# Patient Record
Sex: MALE | Race: BLACK OR AFRICAN AMERICAN | NOT HISPANIC OR LATINO
[De-identification: names, ages, dates, MRNs, and addresses within clinical notes are randomized per-mention and may not be internally consistent; named-entity substitution may affect disease eponyms.]

---

## 2017-09-28 ENCOUNTER — APPOINTMENT (OUTPATIENT)
Dept: UROLOGY | Facility: CLINIC | Age: 72
End: 2017-09-28
Payer: COMMERCIAL

## 2017-09-28 VITALS
DIASTOLIC BLOOD PRESSURE: 100 MMHG | HEART RATE: 78 BPM | TEMPERATURE: 98.4 F | HEIGHT: 68 IN | SYSTOLIC BLOOD PRESSURE: 176 MMHG | RESPIRATION RATE: 17 BRPM

## 2017-09-28 DIAGNOSIS — R97.20 ELEVATED PROSTATE, SPECIFIC ANTIGEN [PSA]: ICD-10-CM

## 2017-09-28 DIAGNOSIS — R35.0 FREQUENCY OF MICTURITION: ICD-10-CM

## 2017-09-28 DIAGNOSIS — C61 MALIGNANT NEOPLASM OF PROSTATE: ICD-10-CM

## 2017-09-28 PROCEDURE — 99204 OFFICE O/P NEW MOD 45 MIN: CPT

## 2017-09-29 LAB — PSA SERPL-MCNC: 18.51 NG/ML

## 2017-10-15 ENCOUNTER — FORM ENCOUNTER (OUTPATIENT)
Age: 72
End: 2017-10-15

## 2017-10-16 ENCOUNTER — OUTPATIENT (OUTPATIENT)
Dept: OUTPATIENT SERVICES | Facility: HOSPITAL | Age: 72
LOS: 1 days | End: 2017-10-16
Payer: MEDICARE

## 2017-10-16 ENCOUNTER — APPOINTMENT (OUTPATIENT)
Dept: MRI IMAGING | Facility: IMAGING CENTER | Age: 72
End: 2017-10-16
Payer: COMMERCIAL

## 2017-10-16 DIAGNOSIS — C61 MALIGNANT NEOPLASM OF PROSTATE: ICD-10-CM

## 2017-10-16 PROCEDURE — A9585: CPT

## 2017-10-16 PROCEDURE — 82565 ASSAY OF CREATININE: CPT

## 2017-10-16 PROCEDURE — 72197 MRI PELVIS W/O & W/DYE: CPT | Mod: 26

## 2017-10-16 PROCEDURE — 72197 MRI PELVIS W/O & W/DYE: CPT

## 2017-10-19 ENCOUNTER — APPOINTMENT (OUTPATIENT)
Dept: UROLOGY | Facility: CLINIC | Age: 72
End: 2017-10-19
Payer: COMMERCIAL

## 2017-10-19 PROCEDURE — 99213 OFFICE O/P EST LOW 20 MIN: CPT

## 2017-10-27 ENCOUNTER — RECORD ABSTRACTING (OUTPATIENT)
Age: 72
End: 2017-10-27

## 2017-10-27 LAB
METANEPHRINE, PL: 102 PG/ML
NORMETANEPHRINE, PL: 181 PG/ML

## 2017-11-09 ENCOUNTER — RECORD ABSTRACTING (OUTPATIENT)
Age: 72
End: 2017-11-09

## 2017-11-09 DIAGNOSIS — D49.7 NEOPLASM OF UNSPECIFIED BEHAVIOR OF ENDOCRINE GLANDS AND OTHER PARTS OF NERVOUS SYSTEM: ICD-10-CM

## 2017-11-09 LAB
CREAT ?TM UR-MCNC: 138.8 MG/DL
DOPAMINE UR-MCNC: 77 UG/L
DOPAMINE UR-MCNC: <30 PG/ML
DOPAMINE, UR, 24HR: 169 UG/24 HR
DOPAMINE/CREAT UR: 113 MCG/G CR
EPINEPH UR-MCNC: 1 UG/L
EPINEPH UR-MCNC: 113 PG/ML
EPINEPH/CREAT UR: 10 MCG/G CR
EPINEPHRINE, U, 24HR: 2 UG/24 HR
EPINEPHRINE/NOEPINEPHRINE CALC TOTAL RAN: 68 MCG/G CR
METANEPH 24H UR-MRATE: NORMAL
METANEPHRINE, PL: 24 PG/ML
NOREPINEPH UR-MCNC: 22 UG/L
NOREPINEPH UR-MCNC: 804 PG/ML
NOREPINEPH/CREAT UR: 58 MCG/G CR
NOREPINEPHRINE,U,24H: 48 UG/24 HR
NORMETANEPHRINE, PL: 177 PG/ML

## 2017-11-20 ENCOUNTER — FORM ENCOUNTER (OUTPATIENT)
Age: 72
End: 2017-11-20

## 2017-11-21 ENCOUNTER — APPOINTMENT (OUTPATIENT)
Dept: MRI IMAGING | Facility: CLINIC | Age: 72
End: 2017-11-21
Payer: COMMERCIAL

## 2017-11-21 ENCOUNTER — OUTPATIENT (OUTPATIENT)
Dept: OUTPATIENT SERVICES | Facility: HOSPITAL | Age: 72
LOS: 1 days | End: 2017-11-21
Payer: MEDICARE

## 2017-11-21 DIAGNOSIS — D49.7 NEOPLASM OF UNSPECIFIED BEHAVIOR OF ENDOCRINE GLANDS AND OTHER PARTS OF NERVOUS SYSTEM: ICD-10-CM

## 2017-11-21 PROCEDURE — A9585: CPT

## 2017-11-21 PROCEDURE — 82565 ASSAY OF CREATININE: CPT

## 2017-11-21 PROCEDURE — 74183 MRI ABD W/O CNTR FLWD CNTR: CPT | Mod: 26

## 2017-11-21 PROCEDURE — 74183 MRI ABD W/O CNTR FLWD CNTR: CPT

## 2017-11-30 LAB — METANEPHS UR-SCNC: NORMAL

## 2018-01-17 ENCOUNTER — INPATIENT (INPATIENT)
Facility: HOSPITAL | Age: 73
LOS: 3 days | Discharge: HOME | DRG: 149 | End: 2018-01-21
Attending: INTERNAL MEDICINE | Admitting: INTERNAL MEDICINE
Payer: SELF-PAY

## 2018-01-17 VITALS
RESPIRATION RATE: 20 BRPM | HEART RATE: 83 BPM | SYSTOLIC BLOOD PRESSURE: 179 MMHG | TEMPERATURE: 99 F | DIASTOLIC BLOOD PRESSURE: 94 MMHG | OXYGEN SATURATION: 99 %

## 2018-01-17 DIAGNOSIS — R42 DIZZINESS AND GIDDINESS: ICD-10-CM

## 2018-01-17 DIAGNOSIS — Z95.1 PRESENCE OF AORTOCORONARY BYPASS GRAFT: Chronic | ICD-10-CM

## 2018-01-17 LAB
ALBUMIN SERPL ELPH-MCNC: 4.5 G/DL — SIGNIFICANT CHANGE UP (ref 3.3–5)
ALP SERPL-CCNC: 72 U/L — SIGNIFICANT CHANGE UP (ref 40–120)
ALT FLD-CCNC: 16 U/L RC — SIGNIFICANT CHANGE UP (ref 10–45)
ANION GAP SERPL CALC-SCNC: 11 MMOL/L — SIGNIFICANT CHANGE UP (ref 5–17)
APPEARANCE UR: CLEAR — SIGNIFICANT CHANGE UP
AST SERPL-CCNC: 22 U/L — SIGNIFICANT CHANGE UP (ref 10–40)
BILIRUB SERPL-MCNC: 1.2 MG/DL — SIGNIFICANT CHANGE UP (ref 0.2–1.2)
BILIRUB UR-MCNC: NEGATIVE — SIGNIFICANT CHANGE UP
BUN SERPL-MCNC: 14 MG/DL — SIGNIFICANT CHANGE UP (ref 7–23)
CALCIUM SERPL-MCNC: 9.3 MG/DL — SIGNIFICANT CHANGE UP (ref 8.4–10.5)
CHLORIDE SERPL-SCNC: 94 MMOL/L — LOW (ref 96–108)
CO2 SERPL-SCNC: 29 MMOL/L — SIGNIFICANT CHANGE UP (ref 22–31)
COLOR SPEC: COLORLESS — SIGNIFICANT CHANGE UP
CREAT SERPL-MCNC: 1.26 MG/DL — SIGNIFICANT CHANGE UP (ref 0.5–1.3)
DIFF PNL FLD: NEGATIVE — SIGNIFICANT CHANGE UP
EPI CELLS # UR: SIGNIFICANT CHANGE UP /HPF
GLUCOSE SERPL-MCNC: 100 MG/DL — HIGH (ref 70–99)
GLUCOSE UR QL: NEGATIVE — SIGNIFICANT CHANGE UP
HCT VFR BLD CALC: 43.1 % — SIGNIFICANT CHANGE UP (ref 39–50)
HGB BLD-MCNC: 15.2 G/DL — SIGNIFICANT CHANGE UP (ref 13–17)
KETONES UR-MCNC: NEGATIVE — SIGNIFICANT CHANGE UP
LEUKOCYTE ESTERASE UR-ACNC: NEGATIVE — SIGNIFICANT CHANGE UP
MCHC RBC-ENTMCNC: 33.1 PG — SIGNIFICANT CHANGE UP (ref 27–34)
MCHC RBC-ENTMCNC: 35.3 GM/DL — SIGNIFICANT CHANGE UP (ref 32–36)
MCV RBC AUTO: 93.7 FL — SIGNIFICANT CHANGE UP (ref 80–100)
NITRITE UR-MCNC: NEGATIVE — SIGNIFICANT CHANGE UP
PH UR: 6.5 — SIGNIFICANT CHANGE UP (ref 5–8)
PLATELET # BLD AUTO: 200 K/UL — SIGNIFICANT CHANGE UP (ref 150–400)
POTASSIUM SERPL-MCNC: 3.8 MMOL/L — SIGNIFICANT CHANGE UP (ref 3.5–5.3)
POTASSIUM SERPL-SCNC: 3.8 MMOL/L — SIGNIFICANT CHANGE UP (ref 3.5–5.3)
PROT SERPL-MCNC: 7.7 G/DL — SIGNIFICANT CHANGE UP (ref 6–8.3)
PROT UR-MCNC: NEGATIVE — SIGNIFICANT CHANGE UP
RBC # BLD: 4.6 M/UL — SIGNIFICANT CHANGE UP (ref 4.2–5.8)
RBC # FLD: 11.4 % — SIGNIFICANT CHANGE UP (ref 10.3–14.5)
SODIUM SERPL-SCNC: 134 MMOL/L — LOW (ref 135–145)
SP GR SPEC: <1.005 — LOW (ref 1.01–1.02)
TROPONIN T SERPL-MCNC: <0.01 NG/ML — SIGNIFICANT CHANGE UP (ref 0–0.06)
UROBILINOGEN FLD QL: NEGATIVE — SIGNIFICANT CHANGE UP
WBC # BLD: 6.1 K/UL — SIGNIFICANT CHANGE UP (ref 3.8–10.5)
WBC # FLD AUTO: 6.1 K/UL — SIGNIFICANT CHANGE UP (ref 3.8–10.5)

## 2018-01-17 PROCEDURE — 71046 X-RAY EXAM CHEST 2 VIEWS: CPT | Mod: 26

## 2018-01-17 PROCEDURE — 99285 EMERGENCY DEPT VISIT HI MDM: CPT

## 2018-01-17 RX ORDER — SODIUM CHLORIDE 9 MG/ML
1000 INJECTION INTRAMUSCULAR; INTRAVENOUS; SUBCUTANEOUS ONCE
Qty: 0 | Refills: 0 | Status: COMPLETED | OUTPATIENT
Start: 2018-01-17 | End: 2018-01-17

## 2018-01-17 RX ORDER — MECLIZINE HCL 12.5 MG
50 TABLET ORAL ONCE
Qty: 0 | Refills: 0 | Status: COMPLETED | OUTPATIENT
Start: 2018-01-17 | End: 2018-01-17

## 2018-01-17 RX ADMIN — SODIUM CHLORIDE 2000 MILLILITER(S): 9 INJECTION INTRAMUSCULAR; INTRAVENOUS; SUBCUTANEOUS at 18:24

## 2018-01-17 RX ADMIN — Medication 50 MILLIGRAM(S): at 20:44

## 2018-01-17 RX ADMIN — SODIUM CHLORIDE 2000 MILLILITER(S): 9 INJECTION INTRAMUSCULAR; INTRAVENOUS; SUBCUTANEOUS at 22:24

## 2018-01-17 NOTE — ED PROVIDER NOTE - OBJECTIVE STATEMENT
71 yo m with pmhx of CAD and pshx of quadruple bypass 5 years ago presents with dizziness and fatigue that began 7-8 days ago. Dizziness exacerbated when quickly getting up or lying down. Denies history of HTN, but has noticed blood pressure to be elevated in the past couple of months. Had been regularly taking 50 mg Toprol once a day but changed it to 75 mg twice a day 3-4 days ago. Pt reports feeling light headed and uncomfortable, especially when getting up and lying down quickly. Denies fever, chest pain, swelling in legs, dysuria, HLD, weakness, pain. C/o slight shortness of breath and nausea.

## 2018-01-17 NOTE — ED ADULT NURSE NOTE - CHIEF COMPLAINT QUOTE
Dizziness and LOZANO x 1 week, worsened today. Pt reports he began taking his pressure at home the past few days and it has been high. Pt got an appointment with his PCP for next Tuesday and self-increased BP meds in the mean time until he sees his MD. Pt has hx CABG x 4 and HTN, on metoprolol. Pt also reports hx IBS with diarrhea today, states "I feel dry." Pt denies chest pain at this time.

## 2018-01-17 NOTE — ED PROVIDER NOTE - MEDICAL DECISION MAKING DETAILS
71 yo male with pmhx of CAD presents with dizziness, slight shortness of breath, and nausea. On exam, gait ws normal. EKG within normal limits. Plan: labs, urine, cardiac monitor, and reassess. 73 yo male with pmhx of CAD presents with dizziness, slight shortness of breath, and nausea. On exam, neuro exam nonfocal, gait normal. EKG within normal limits. assessment: no focal neuro findings - pt describes dizziness with position change. Suspect orthostatic hypotension. Will reassess after IVF. Plan: labs, urine, cardiac monitor, and reassess.

## 2018-01-17 NOTE — PROVIDER CONTACT NOTE (OTHER) - ASSESSMENT
Pt A&Ox4. VSS-blood pressure manually 150/80. Pt does not want to get up till after breakfast. Pt states that he feels dizzy getting out of bed.

## 2018-01-17 NOTE — ED PROVIDER NOTE - NS_ ATTENDINGSCRIBEDETAILS _ED_A_ED_FT
I,Patti Navarro, performed the initial face to face bedside interview with this patient regarding history of present illness, review of symptoms and relevant past medical, social and family history.  I completed an independent physical examination.  I was the initial provider who evaluated this patient. The history, relevant review of systems, past medical and surgical history, medical decision making, and physical examination was documented by the scribe in my presence and I attest to the accuracy of the documentation.

## 2018-01-17 NOTE — ED PROVIDER NOTE - PROGRESS NOTE DETAILS
Chandan Angela MD: Pt reassessed, still getting fluids, discussed possible neuro admission vs outpatient therapy. Pt states feeling slightly better, feels comfortable going home with outpatient follow up with PMD/cardiologist/ neurologist for symptoms. Dramamine - feeling slightly better, will try meclozine at this time with fluids. Chandan Angela MD: patient still symptomatic, will admit to hospital, spoke to Dr. Zay Lowery who states if patient under Dr. Gao please admit to Dr. Howard or hosptialist. MRI ordered and neurology consulted.

## 2018-01-17 NOTE — ED ADULT TRIAGE NOTE - CHIEF COMPLAINT QUOTE
Dizziness x 1 week, worsened today. Pt reports he began taking his pressure at home the past few days and it has been high. Pt got an appointment with his PCP for next Tuesday and self-increased BP meds in the mean time until he sees his MD. Pt has hx CABG x 4 and HTN, on metoprolol. Pt also reports hx IBS with diarrhea today, states "I feel dry." Pt denies chest pain at this time.

## 2018-01-17 NOTE — PROVIDER CONTACT NOTE (OTHER) - REASON
Pt is refusing to do orthostatics this morning, refuses heparin and wants to take metoprolol after breakfast

## 2018-01-17 NOTE — ED ADULT NURSE NOTE - OBJECTIVE STATEMENT
71 yo M with pmhx of CAD, htn, pw dizziness x 1 week worsening today. Worse when pt goes from lying to standing. Orthostatic negative.   VSS. NAD. denies cp/sob/ focal weakness, fevers/chills, n/v/d, abd pain, melena, HA/visual changes.  Abd soft, nt/nd.

## 2018-01-18 DIAGNOSIS — Z29.9 ENCOUNTER FOR PROPHYLACTIC MEASURES, UNSPECIFIED: ICD-10-CM

## 2018-01-18 DIAGNOSIS — R42 DIZZINESS AND GIDDINESS: ICD-10-CM

## 2018-01-18 DIAGNOSIS — I25.810 ATHEROSCLEROSIS OF CORONARY ARTERY BYPASS GRAFT(S) WITHOUT ANGINA PECTORIS: ICD-10-CM

## 2018-01-18 LAB
ANION GAP SERPL CALC-SCNC: 14 MMOL/L — SIGNIFICANT CHANGE UP (ref 5–17)
APTT BLD: 27.5 SEC — SIGNIFICANT CHANGE UP (ref 27.5–37.4)
BASOPHILS # BLD AUTO: 0.02 K/UL — SIGNIFICANT CHANGE UP (ref 0–0.2)
BASOPHILS NFR BLD AUTO: 0.3 % — SIGNIFICANT CHANGE UP (ref 0–2)
BUN SERPL-MCNC: 9 MG/DL — SIGNIFICANT CHANGE UP (ref 7–23)
CALCIUM SERPL-MCNC: 9.1 MG/DL — SIGNIFICANT CHANGE UP (ref 8.4–10.5)
CHLORIDE SERPL-SCNC: 103 MMOL/L — SIGNIFICANT CHANGE UP (ref 96–108)
CK MB BLD-MCNC: 1.4 % — SIGNIFICANT CHANGE UP (ref 0–3.5)
CK MB CFR SERPL CALC: 1.7 NG/ML — SIGNIFICANT CHANGE UP (ref 0–6.7)
CK MB CFR SERPL CALC: 2 NG/ML — SIGNIFICANT CHANGE UP (ref 0–6.7)
CK SERPL-CCNC: 122 U/L — SIGNIFICANT CHANGE UP (ref 30–200)
CK SERPL-CCNC: 149 U/L — SIGNIFICANT CHANGE UP (ref 30–200)
CO2 SERPL-SCNC: 24 MMOL/L — SIGNIFICANT CHANGE UP (ref 22–31)
CORTIS AM PEAK SERPL-MCNC: 4.3 UG/DL — LOW (ref 6–18.4)
CREAT SERPL-MCNC: 1.15 MG/DL — SIGNIFICANT CHANGE UP (ref 0.5–1.3)
EOSINOPHIL # BLD AUTO: 0.04 K/UL — SIGNIFICANT CHANGE UP (ref 0–0.5)
EOSINOPHIL NFR BLD AUTO: 0.6 % — SIGNIFICANT CHANGE UP (ref 0–6)
GLUCOSE SERPL-MCNC: 92 MG/DL — SIGNIFICANT CHANGE UP (ref 70–99)
HBA1C BLD-MCNC: 4.9 % — SIGNIFICANT CHANGE UP (ref 4–5.6)
HCT VFR BLD CALC: 42.2 % — SIGNIFICANT CHANGE UP (ref 39–50)
HGB BLD-MCNC: 14.2 G/DL — SIGNIFICANT CHANGE UP (ref 13–17)
IMM GRANULOCYTES NFR BLD AUTO: 0.2 % — SIGNIFICANT CHANGE UP (ref 0–1.5)
INR BLD: 1.01 RATIO — SIGNIFICANT CHANGE UP (ref 0.88–1.16)
LYMPHOCYTES # BLD AUTO: 2.75 K/UL — SIGNIFICANT CHANGE UP (ref 1–3.3)
LYMPHOCYTES # BLD AUTO: 42.4 % — SIGNIFICANT CHANGE UP (ref 13–44)
MCHC RBC-ENTMCNC: 30.6 PG — SIGNIFICANT CHANGE UP (ref 27–34)
MCHC RBC-ENTMCNC: 33.6 GM/DL — SIGNIFICANT CHANGE UP (ref 32–36)
MCV RBC AUTO: 90.9 FL — SIGNIFICANT CHANGE UP (ref 80–100)
MONOCYTES # BLD AUTO: 0.52 K/UL — SIGNIFICANT CHANGE UP (ref 0–0.9)
MONOCYTES NFR BLD AUTO: 8 % — SIGNIFICANT CHANGE UP (ref 2–14)
NEUTROPHILS # BLD AUTO: 3.15 K/UL — SIGNIFICANT CHANGE UP (ref 1.8–7.4)
NEUTROPHILS NFR BLD AUTO: 48.5 % — SIGNIFICANT CHANGE UP (ref 43–77)
PLATELET # BLD AUTO: 201 K/UL — SIGNIFICANT CHANGE UP (ref 150–400)
POTASSIUM SERPL-MCNC: 4 MMOL/L — SIGNIFICANT CHANGE UP (ref 3.5–5.3)
POTASSIUM SERPL-SCNC: 4 MMOL/L — SIGNIFICANT CHANGE UP (ref 3.5–5.3)
PROTHROM AB SERPL-ACNC: 11.4 SEC — SIGNIFICANT CHANGE UP (ref 10–13.1)
RBC # BLD: 4.64 M/UL — SIGNIFICANT CHANGE UP (ref 4.2–5.8)
RBC # FLD: 12.5 % — SIGNIFICANT CHANGE UP (ref 10.3–14.5)
SODIUM SERPL-SCNC: 141 MMOL/L — SIGNIFICANT CHANGE UP (ref 135–145)
TROPONIN T SERPL-MCNC: <0.01 NG/ML — SIGNIFICANT CHANGE UP (ref 0–0.06)
TROPONIN T SERPL-MCNC: <0.01 NG/ML — SIGNIFICANT CHANGE UP (ref 0–0.06)
WBC # BLD: 6.49 K/UL — SIGNIFICANT CHANGE UP (ref 3.8–10.5)
WBC # FLD AUTO: 6.49 K/UL — SIGNIFICANT CHANGE UP (ref 3.8–10.5)

## 2018-01-18 PROCEDURE — 99223 1ST HOSP IP/OBS HIGH 75: CPT

## 2018-01-18 PROCEDURE — 93306 TTE W/DOPPLER COMPLETE: CPT | Mod: 26

## 2018-01-18 RX ORDER — ASPIRIN/CALCIUM CARB/MAGNESIUM 324 MG
81 TABLET ORAL DAILY
Qty: 0 | Refills: 0 | Status: DISCONTINUED | OUTPATIENT
Start: 2018-01-18 | End: 2018-01-21

## 2018-01-18 RX ORDER — DIAZEPAM 5 MG
5 TABLET ORAL ONCE
Qty: 0 | Refills: 0 | Status: DISCONTINUED | OUTPATIENT
Start: 2018-01-18 | End: 2018-01-18

## 2018-01-18 RX ORDER — INFLUENZA VIRUS VACCINE 15; 15; 15; 15 UG/.5ML; UG/.5ML; UG/.5ML; UG/.5ML
0.5 SUSPENSION INTRAMUSCULAR ONCE
Qty: 0 | Refills: 0 | Status: COMPLETED | OUTPATIENT
Start: 2018-01-18 | End: 2018-01-21

## 2018-01-18 RX ORDER — DIAZEPAM 5 MG
5 TABLET ORAL EVERY 8 HOURS
Qty: 0 | Refills: 0 | Status: DISCONTINUED | OUTPATIENT
Start: 2018-01-18 | End: 2018-01-21

## 2018-01-18 RX ORDER — ACETAMINOPHEN 500 MG
650 TABLET ORAL EVERY 6 HOURS
Qty: 0 | Refills: 0 | Status: DISCONTINUED | OUTPATIENT
Start: 2018-01-18 | End: 2018-01-21

## 2018-01-18 RX ORDER — HEPARIN SODIUM 5000 [USP'U]/ML
5000 INJECTION INTRAVENOUS; SUBCUTANEOUS EVERY 8 HOURS
Qty: 0 | Refills: 0 | Status: DISCONTINUED | OUTPATIENT
Start: 2018-01-18 | End: 2018-01-21

## 2018-01-18 RX ORDER — ROSUVASTATIN CALCIUM 5 MG/1
5 TABLET ORAL AT BEDTIME
Qty: 0 | Refills: 0 | Status: DISCONTINUED | OUTPATIENT
Start: 2018-01-18 | End: 2018-01-21

## 2018-01-18 RX ORDER — METOPROLOL TARTRATE 50 MG
50 TABLET ORAL DAILY
Qty: 0 | Refills: 0 | Status: DISCONTINUED | OUTPATIENT
Start: 2018-01-18 | End: 2018-01-21

## 2018-01-18 RX ADMIN — Medication 81 MILLIGRAM(S): at 11:40

## 2018-01-18 RX ADMIN — Medication 50 MILLIGRAM(S): at 08:52

## 2018-01-18 RX ADMIN — Medication 5 MILLIGRAM(S): at 01:22

## 2018-01-18 NOTE — H&P ADULT - HISTORY OF PRESENT ILLNESS
NIGHT HOSPITALIST:  Patient UNKNOWN to me previously, assigned to me at this point via the ER and by Dr. 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  Patient noted some relief with meclizine given in the ER but patient declines further doses.  Patient denies HA, focal weakness.  No chest pain/pressure.  No dyspnea at present, but admits to exercise limited symptoms.  No abdominal pain, no red blood per rectum or melena.  NO back pain, no tearing back pain.  No fever, no chills, no rigors.  NO hematuria, no dysuria.  Patient admits to increased thirst and urination but reports a normal random glucose.  No weight loss or anorexia.  No sick contacts or recent travel.  Remaining review of systems not contributory.

## 2018-01-18 NOTE — PROGRESS NOTE ADULT - ASSESSMENT
73 yo male history as noted including CAD, htn, a/w dizziness    neuro consult noted  awaiting mri/a brain  meclizine prn    cards f/u  echo pending    cont home cardiac meds  monitor bp    dvt ppx  PT

## 2018-01-18 NOTE — H&P ADULT - NSHPLABSRESULTS_GEN_ALL_CORE
WBC 6.1.  Hgb 15.2.  Platelets of 200K.  K+ 3.8.  random glucose of 100.  Cr 1.2.  Troponin nil x 1.  Alb 4.5.  U/A negative.  Chest radiograph reviewed with no infiltrate or effusion.  Chest radiograph reviewed with no infiltrate or effusion.  EKG tracing reviewed with NSR at 73 with Q III, F.

## 2018-01-18 NOTE — H&P ADULT - ATTENDING COMMENTS
NIGHT HOSPITALIST:  Patient aware of course and agree with plan/care as above.  Care reviewed with the covering NP.  Care assumed by the Dr. Pack.

## 2018-01-18 NOTE — H&P ADULT - PROBLEM SELECTOR PLAN 1
Appreciate neurology evaluation.  MRI brain ordered.  Admitted to telemetry to exclude cardiac equivalent.  Orthostatics.  Metoprolol changed to 50 mg daily for now.

## 2018-01-18 NOTE — CONSULT NOTE ADULT - SUBJECTIVE AND OBJECTIVE BOX
BENNETT RODRIGUEZ Patient is a 72y old  Male who presents with a chief complaint of dizziness.     HPI:  Pt is a 71 y/o man with hx of HTN who p/w 7 days of dizziness and room-spinning sensation. 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 He took his blood pressure and it was in the 180's -190s so he increased his dose of metoprolol. He also started taking Dramamine 50mg BID but felt that this didn't help. The dizziness is usually worse when getting up suddenly out of bed or moving quickly. He doesn't notice it particularly to one side. In the last couple days he thinks it has been more constant even if he is sitting down still. He describes it as things moving around him, that he is off balance slightly, but no gait abnormality.  His BP has been fluctuating recently and was worked up as outpatient for possible pheo but was equivocal.       MEDICATIONS  (STANDING): Home: Metoprolol, Aspirin, Crestor, Lomotil PRN, Zantac PRN  diazepam    Tablet 5 milliGRAM(s) Oral once      PAST MEDICAL & SURGICAL HISTORY:  Coronary artery disease  S/P CABG x 4  HTN    FAMILY HISTORY: HTN    Allergies, No Known  Drug Allergies; seasonal    Intolerances: None    SHx - No smoking, occasional wine No drug abuse      REVIEW OF SYSTEMS:    Constitutional: No fever, weight loss, or fatigue  Eyes: No eye pain, visual disturbances, or discharge  ENMT:  No difficulty hearing, tinnitus, vertigo; No sinus or throat pain  Neck: No pain or stiffness  Respiratory:  some shortness of breath after episodes  Cardiovascular: No chest pain, palpitations, or leg swelling  Gastrointestinal: No abdominal pain, nausea, vomiting, diarrhea or constipation.   Genitourinary: No dysuria, frequency, hematuria, or incontinence  Neurological: As per HPI  Skin: No itching, burning, rashes, or lesions   Endocrine: No heat or cold intolerance; No hair loss  Musculoskeletal: No joint pain or swelling; No muscle, back, or extremity pain  Psychiatric: anxiety  Heme/Lymph: No easy bruising or bleeding; No enlarged glands      Vital Signs Last 24 Hrs  T(C): 36.9 (17 Jan 2018 22:32), Max: 37.1 (17 Jan 2018 15:03)  T(F): 98.5 (17 Jan 2018 22:32), Max: 98.7 (17 Jan 2018 15:03)  HR: 78 (17 Jan 2018 22:32) (78 - 83)  BP: 154/78 (17 Jan 2018 22:32) (154/78 - 179/94)  BP(mean): --  RR: 18 (17 Jan 2018 22:32) (18 - 20)  SpO2: 98% (17 Jan 2018 22:32) (98% - 99%)    General Exam:   General appearance: No acute distress    Cardiac: RRR  Pulm:        breathing comfortably         Neurological Exam:  Mental Status: Orientated to self, date and place.  Attention intact.  No dysarthria. Speech fluent.  Cranial Nerves:   PERRL, EOMI, VFF, no nystagmus.    CN V1-3 intact to light touch .  No facial asymmetry.  Hearing intact bilaterally.  Tongue  midline.  Sternocleidomastoid and Trapezius intact bilaterally.    Motor:   Tone: normal.                  Strength:     [] Upper extremity                      Delt       Bicep    Tricep                                                  R         5/5        5/5        5/5       5/5                                               L          5/5        5/5        5/5       5/5  [] Lower extremity                       HF          KE          KF        DF         PF                                               R        5/5        5/5        5/5       5/5       5/5                                               L         5/5        5/5       5/5       5/5        5/5             Dysmetria: None to finger-nose-finger or heel-shin-heel  No truncal ataxia.    Tremor: No resting, postural or action tremor.  No myoclonus.    Sensation: intact to light touch, pinprick, vibration and proprioception    Deep Tendon Reflexes:     Biceps          Triceps      BR        Patellar        Ankle         Babinski                                  R       2+                   2+           2+            2+               2+           downgoing                                  L        2+                  2+           2+            2+               2+           downgoing    Gait: normal.  Difficulty with tandem which pt attributed to being lightheaded from not eating.     Other: Kay Yee Osgood maneuver Positive to  the Right, subsided after 20 seconds    01-17    134<L>  |  94<L>  |  14  ----------------------------<  100<H>  3.8   |  29  |  1.26    Ca    9.3      17 Jan 2018 16:47    TPro  7.7  /  Alb  4.5  /  TBili  1.2  /  DBili  x   /  AST  22  /  ALT  16  /  AlkPhos  72  01-17                            15.2   6.1   )-----------( 200      ( 17 Jan 2018 16:47 )             43.1

## 2018-01-18 NOTE — CONSULT NOTE ADULT - SUBJECTIVE AND OBJECTIVE BOX
Patient seen and evaluated @ 8am on [address]  Chief Complaint: Dizziness/vertigo    HPI:  NIGHT HOSPITALIST:  Patient UNKNOWN to me previously, assigned to me at this point via the ER and by Dr. 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  Patient noted some relief with meclizine given in the ER but patient declines further doses.  Patient denies HA, focal weakness.  No chest pain/pressure.  No dyspnea at present, but admits to exercise limited symptoms.  No abdominal pain, no red blood per rectum or melena.  NO back pain, no tearing back pain.  No fever, no chills, no rigors.  NO hematuria, no dysuria.  Patient admits to increased thirst and urination but reports a normal random glucose.  No weight loss or anorexia.  No sick contacts or recent travel.  Remaining review of systems not contributory. (18 Jan 2018 02:55)    PMH:   Coronary artery disease    PSH:   S/P CABG x 4    Medications:   acetaminophen   Tablet. 650 milliGRAM(s) Oral every 6 hours PRN  aspirin enteric coated 81 milliGRAM(s) Oral daily  diazepam    Tablet 5 milliGRAM(s) Oral every 8 hours PRN  heparin  Injectable 5000 Unit(s) SubCutaneous every 8 hours  influenza   Vaccine 0.5 milliLiter(s) IntraMuscular once  metoprolol succinate ER 50 milliGRAM(s) Oral daily  rosuvastatin 5 milliGRAM(s) Oral at bedtime    Allergies:  No Known Allergies    FAMILY HISTORY:  Family history of hypertension (Sibling)    Social History: , works as cardiologist  Smoking: nonsmoker  Alcohol: no EtOH  Drugs: no illicit drug use    Review of Systems:   Constitutional: No fever, chills, fatigue, or changes in weight  HEENT: No blurry vision, eye pain, headache, runny nose, or sore throat  Respiratory: No shortness of breath, cough, or wheezing  Cardiovascular: No chest pain or palpitations  Gastrointestinal: No nausea, vomiting, diarrhea, or abdominal pain  Genitourinary: No dysuria or incontinence  Extremities: No lower extremity swelling  Neurologic: +vertigo (see HPI)  Lymphatic: No lymphadenopathy   Skin: No rash  Psychiatry: No anxiety or depression  10 point review of systems is otherwise negative except as mentioned above            Physical Exam:  T(C): 36.7 (01-18-18 @ 12:23), Max: 36.9 (01-17-18 @ 22:32)  HR: 85 (01-18-18 @ 08:47) (70 - 85)  BP: 172/83 (01-18-18 @ 08:47) (150/80 - 173/91)  RR: 18 (01-18-18 @ 12:23) (18 - 18)  SpO2: 96% (01-18-18 @ 12:23) (96% - 98%)  Wt(kg): --    01-17 @ 07:01  -  01-18 @ 07:00  --------------------------------------------------------  IN: 200 mL / OUT: 550 mL / NET: -350 mL    01-18 @ 07:01  -  01-18 @ 19:14  --------------------------------------------------------  IN: 480 mL / OUT: 900 mL / NET: -420 mL      Daily Height in cm: 172.72 (18 Jan 2018 03:59)    Daily     Appearance: Normal, well groomed, NAD  Eyes: PERRLA, EOMI, pink conjunctiva, no scleral icterus   HENT: Normal oral mucosa  Cardiovascular: RRR, S1, S2, no murmur, rub, or gallop; no edema; no JVD  Procedural Access Site: Clean, dry, intact, without hematoma  Respiratory: Clear to auscultation bilaterally  Gastrointestinal: Soft, non-tender, non-distended, BS+  Musculoskeletal: No clubbing or joint deformity   Neurologic: No focal weakness  Lymphatic: No lymphadenopathy  Psychiatry: AAOx3 with appropriate mood and affect  Skin: No rashes, ecchymoses, or cyanosis    Cardiovascular Diagnostic Testing:    ECG: NSR    Echo: PENDING    Interpretation of Telemetry: NSR with PVCs    Imaging:    Labs:                        14.2   6.49  )-----------( 201      ( 18 Jan 2018 07:57 )             42.2     01-18    141  |  103  |  9   ----------------------------<  92  4.0   |  24  |  1.15    Ca    9.1      18 Jan 2018 08:07    TPro  7.7  /  Alb  4.5  /  TBili  1.2  /  DBili  x   /  AST  22  /  ALT  16  /  AlkPhos  72  01-17    PT/INR - ( 18 Jan 2018 07:56 )   PT: 11.4 sec;   INR: 1.01 ratio         PTT - ( 18 Jan 2018 07:56 )  PTT:27.5 sec  CARDIAC MARKERS ( 18 Jan 2018 17:29 )  x     / <0.01 ng/mL / x     / x     / 2.0 ng/mL  CARDIAC MARKERS ( 18 Jan 2018 05:43 )  x     / <0.01 ng/mL / 122 U/L / x     / 1.7 ng/mL  CARDIAC MARKERS ( 17 Jan 2018 16:47 )  x     / <0.01 ng/mL / x     / x     / x              Hemoglobin A1C, Whole Blood: 4.9 % (01-18 @ 07:57)

## 2018-01-18 NOTE — CONSULT NOTE ADULT - ATTENDING COMMENTS
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  Exam nonfocal.  Presumed right BPPV, though with risk factors need to rule out posterior circulation stroke.      NIHSS 0    - for MRI brain, MRA head  - will add MRA neck  - ASA/crestor  - DVT  prophylaxis  - stroke education  - if symptoms persist as outpt can f/u for vestibular rehab with repositioning maneuvers

## 2018-01-18 NOTE — H&P ADULT - NSHPPHYSICALEXAM_GEN_ALL_CORE
Physical exam with an elderly, chronically ill appearing M in no acute distress.  BP  179/94>>157/85  Afebrile.  98% on RA.  RR 14.  HEENT, PERRL, EOMI, mild alopecia.  Neck supple, chest clear, cor s1 s2, abdomen soft normal bowel sounds, no bruits.  Ext no c/c/e.  skin clear but dry.  Poor nail hygiene.  Neurologic exam AxOx3.  Speech fluent.  Cognition intact.  UE/LE 5/5.  Hallpike Grafton maneuver not repeated but positive with neurology earlier.

## 2018-01-18 NOTE — INPATIENT CERTIFICATION FOR MEDICARE PATIENTS - RISKS OF ADVERSE EVENTS
Concern for neurologic deterioration/Concern for delay in diagnosis and treatment/Concern for cardiopulmonary deterioration

## 2018-01-18 NOTE — H&P ADULT - ASSESSMENT
NIGHT HOSPITALIST:  Presentation of patient with NIGHT HOSPITALIST:  Presentation of patient with symptoms of dizziness in the setting of patient with CAD but apparently has been self titrating his metoprolol from 50 mg daily up to 75 mg BID>>will reduce metoprolol to the initial dose for now of 50 mg daily with adjustment of the medication as needed>>follow orthostatics.  Seen by neurology with suspicion for peripheral vertigo, but MRI brain ordered as above>>patient declines further meclizine for now.  Patient may benefit from the Epley maneuver but patient declines at present.  Will obtain PT assessment.   Unclear if patient has secondary causes of HTN>>patient with family history of essential HTN.  Will obtain echo to assess LV function.  Patient with subjective polyuria and polydipsia but denies weight loss>>will check HgbA1C.

## 2018-01-18 NOTE — PATIENT PROFILE ADULT. - MEDICATIONS BROUGHT TO HOSPITAL, PROFILE
Patient: Henrique Cali Date: 10/18/2017   : 6/3/1930 Attending: Sara Soto MD   87 tenderness.  Neck: no adenopathy, no carotid bruit, no JVD, supple, symmetrical, trachea midline and thyroid not enlarged, symmetric, no tenderness/mass/nodules  Lungs: clear to auscultation bilaterally  Heart: regular rate and rhythm, S1, S2 normal, no murmur, click, rub or gallop  Abdomen: Soft, non-tender; bowel sounds normal; no masses, no organomegaly  Extremities: Mild lower extremity edema  Neurologic: Gait: Abnormal and Early knee recurvatum on the left, marked flexion at the waist  Step to gait pattern    Laboratory Results:    Lab Results   Component Value Date    SODIUM 133 (L) 10/16/2017    POTASSIUM 4.9 10/16/2017    CHLORIDE 98 10/16/2017    CO2 26 10/16/2017    CALCIUM 8.7 10/16/2017    BUN 64 (H) 10/16/2017    CREATININE 4.63 (H) 10/16/2017    MG 1.8 10/09/2017    INR 1.1 10/01/2017    PT 12.6 (H) 10/01/2017    WBC 5.7 10/16/2017    HCT 29.0 (L) 10/16/2017    HGB 9.5 (L) 10/16/2017     10/16/2017    TSH 3.314 10/17/2017    ALBUMIN 3.0 (L) 10/09/2017    GFRA 12 10/16/2017    GFRNA 11 10/16/2017    GLUCOSE 184 (H) 10/16/2017       Imaging: Head CT scan October 1  IMPRESSION:    1.  Unchanged right thalamic intraparenchymal hemorrhage.  2.  No new areas of hemorrhage.  3.  Age-related cerebral atrophy and probable chronic microvascular  ischemic changes         Other: Myocardial perfusion rest/stress, no ischemia    Current Functional status over the last 24 hours:    Nursing Skin Documentation:   Integumentary Assessment: Exceptions to WDL (10/18/17 0938)   Bladder FIM Documentation:            Score: 5-Supervision: Pt requires set up or emptying of urinal;Patient requires set up or emptying of commode (10/18/17 0900)            Bowel FIM Documentation:        Score: 6-Modified Independent: Pt uses medication for bowel control (10/18/17 0900)               Pain Documentation:   Pain Assessment: Exceptions to WDL (10/18/17 8940)   Mobility Documentation:   ,    Sit to Stand: Sara Soto M.D.       no

## 2018-01-18 NOTE — PROGRESS NOTE ADULT - SUBJECTIVE AND OBJECTIVE BOX
BENNETT RODRIGUEZ  72y Male  MRN:[id number]    Patient is a 72y old  Male who presents with a chief complaint of Dizziness for the past 7 days (18 Jan 2018 02:55)    HPI:  NIGHT HOSPITALIST:  Patient UNKNOWN to me previously, assigned to me at this point via the ER and by Dr. 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  Patient noted some relief with meclizine given in the ER but patient declines further doses.  Patient denies HA, focal weakness.  No chest pain/pressure.  No dyspnea at present, but admits to exercise limited symptoms.  No abdominal pain, no red blood per rectum or melena.  NO back pain, no tearing back pain.  No fever, no chills, no rigors.  NO hematuria, no dysuria.  Patient admits to increased thirst and urination but reports a normal random glucose.  No weight loss or anorexia.  No sick contacts or recent travel.  Remaining review of systems not contributory. (18 Jan 2018 02:55)      Patient seen and evaluated at bedside. No acute events overnight except as noted.    Interval HPI: still with c/o dizziness.     PAST MEDICAL & SURGICAL HISTORY:  Coronary artery disease  S/P CABG x 4      REVIEW OF SYSTEMS:  as per hpi    VITALS:  Vital Signs Last 24 Hrs  T(C): 36.7 (18 Jan 2018 12:23), Max: 36.9 (17 Jan 2018 22:32)  T(F): 98.1 (18 Jan 2018 12:23), Max: 98.5 (17 Jan 2018 22:32)  HR: 85 (18 Jan 2018 08:47) (70 - 85)  BP: 172/83 (18 Jan 2018 08:47) (150/80 - 173/91)  BP(mean): --  RR: 18 (18 Jan 2018 12:23) (18 - 18)  SpO2: 96% (18 Jan 2018 12:23) (96% - 99%)  CAPILLARY BLOOD GLUCOSE        I&O's Summary    17 Jan 2018 07:01  -  18 Jan 2018 07:00  --------------------------------------------------------  IN: 200 mL / OUT: 550 mL / NET: -350 mL    18 Jan 2018 07:01  -  18 Jan 2018 18:20  --------------------------------------------------------  IN: 480 mL / OUT: 900 mL / NET: -420 mL        PHYSICAL EXAM:  GENERAL: NAD, well-developed  HEAD:  Atraumatic, Normocephalic  EYES: EOMI, PERRLA, conjunctiva and sclera clear  NECK: Supple, No JVD  CHEST/LUNG: Clear to auscultation bilaterally; No wheeze  HEART: S1, S2; No murmurs, rubs, or gallops  ABDOMEN: Soft, Nontender, Nondistended; Bowel sounds present  EXTREMITIES:  2+ Peripheral Pulses, No clubbing, cyanosis, or edema  PSYCH: Normal affect  NEUROLOGY: AAOX3; non-focal  SKIN: No rashes or lesions    Consultant(s) Notes Reviewed:  [x ] YES  [ ] NO  Care Discussed with Consultants/Other Providers [ x] YES  [ ] NO    MEDS:  MEDICATIONS  (STANDING):  aspirin enteric coated 81 milliGRAM(s) Oral daily  heparin  Injectable 5000 Unit(s) SubCutaneous every 8 hours  influenza   Vaccine 0.5 milliLiter(s) IntraMuscular once  metoprolol succinate ER 50 milliGRAM(s) Oral daily  rosuvastatin 5 milliGRAM(s) Oral at bedtime    MEDICATIONS  (PRN):  acetaminophen   Tablet. 650 milliGRAM(s) Oral every 6 hours PRN Mild Pain (1 - 3)  diazepam    Tablet 5 milliGRAM(s) Oral every 8 hours PRN BPPV    ALLERGIES:  No Known Allergies      LABS:                        14.2   6.49  )-----------( 201      ( 18 Jan 2018 07:57 )             42.2     01-18    141  |  103  |  9   ----------------------------<  92  4.0   |  24  |  1.15    Ca    9.1      18 Jan 2018 08:07    TPro  7.7  /  Alb  4.5  /  TBili  1.2  /  DBili  x   /  AST  22  /  ALT  16  /  AlkPhos  72  01-17    PT/INR - ( 18 Jan 2018 07:56 )   PT: 11.4 sec;   INR: 1.01 ratio         PTT - ( 18 Jan 2018 07:56 )  PTT:27.5 sec  CARDIAC MARKERS ( 18 Jan 2018 17:29 )  x     / <0.01 ng/mL / x     / x     / 2.0 ng/mL  CARDIAC MARKERS ( 18 Jan 2018 05:43 )  x     / <0.01 ng/mL / 122 U/L / x     / 1.7 ng/mL  CARDIAC MARKERS ( 17 Jan 2018 16:47 )  x     / <0.01 ng/mL / x     / x     / x          LIVER FUNCTIONS - ( 17 Jan 2018 16:47 )  Alb: 4.5 g/dL / Pro: 7.7 g/dL / ALK PHOS: 72 U/L / ALT: 16 U/L RC / AST: 22 U/L / GGT: x           Urinalysis Basic - ( 17 Jan 2018 19:19 )    Color: Colorless / Appearance: Clear / SG: <1.005 / pH: x  Gluc: x / Ketone: Negative  / Bili: Negative / Urobili: Negative   Blood: x / Protein: Negative / Nitrite: Negative   Leuk Esterase: Negative / RBC: x / WBC x   Sq Epi: x / Non Sq Epi: Occasional /HPF / Bacteria: x      TSH:   A1c:Hemoglobin A1C, Whole Blood: 4.9 % (01-18 @ 07:57)    BNP:  Lipid panel:   cultures:     RADIOLOGY & ADDITIONAL TESTS:    Imaging Personally Reviewed:  [ x] YES  [ ] NO

## 2018-01-18 NOTE — CONSULT NOTE ADULT - ASSESSMENT
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 He presented to the [facility] ED with complaints of persistent and debilitating dizziness. The dizziness is described as positional vertigo.  Suspect BPPV.    Will check echocardiogram.  Consider repeat work-up for pheochromocytoma in patient who remains hypertensive despite escalating doses of beta-blocker.  Appreciate neurology recommendations. Imaging of brain (MRI/MRA) pending.

## 2018-01-18 NOTE — CONSULT NOTE ADULT - ASSESSMENT
71 y/o man with hx of HTN, CAD s/p CABG p/w 7 days of vertigo neurological exam unremarkable with exception of DHP with notable nystagmus when done on the right side. Diagnosis likely BPPV, low suspicion of central cause for vertigo given lack of other findings on exam.     -Valium 5mg q8hr PRN  -Follow up with neurology as outpatient for Epley maneuver and possible further vestibular testing

## 2018-01-19 ENCOUNTER — TRANSCRIPTION ENCOUNTER (OUTPATIENT)
Age: 73
End: 2018-01-19

## 2018-01-19 LAB
ANION GAP SERPL CALC-SCNC: 13 MMOL/L — SIGNIFICANT CHANGE UP (ref 5–17)
BUN SERPL-MCNC: 10 MG/DL — SIGNIFICANT CHANGE UP (ref 7–23)
CALCIUM SERPL-MCNC: 9.4 MG/DL — SIGNIFICANT CHANGE UP (ref 8.4–10.5)
CHLORIDE SERPL-SCNC: 97 MMOL/L — SIGNIFICANT CHANGE UP (ref 96–108)
CK SERPL-CCNC: 151 U/L — SIGNIFICANT CHANGE UP (ref 30–200)
CO2 SERPL-SCNC: 27 MMOL/L — SIGNIFICANT CHANGE UP (ref 22–31)
CREAT SERPL-MCNC: 0.89 MG/DL — SIGNIFICANT CHANGE UP (ref 0.5–1.3)
GLUCOSE SERPL-MCNC: 86 MG/DL — SIGNIFICANT CHANGE UP (ref 70–99)
HCT VFR BLD CALC: 45.6 % — SIGNIFICANT CHANGE UP (ref 39–50)
HGB BLD-MCNC: 15.2 G/DL — SIGNIFICANT CHANGE UP (ref 13–17)
MCHC RBC-ENTMCNC: 30.9 PG — SIGNIFICANT CHANGE UP (ref 27–34)
MCHC RBC-ENTMCNC: 33.3 GM/DL — SIGNIFICANT CHANGE UP (ref 32–36)
MCV RBC AUTO: 92.7 FL — SIGNIFICANT CHANGE UP (ref 80–100)
PLATELET # BLD AUTO: 217 K/UL — SIGNIFICANT CHANGE UP (ref 150–400)
POTASSIUM SERPL-MCNC: 3.7 MMOL/L — SIGNIFICANT CHANGE UP (ref 3.5–5.3)
POTASSIUM SERPL-SCNC: 3.7 MMOL/L — SIGNIFICANT CHANGE UP (ref 3.5–5.3)
RBC # BLD: 4.92 M/UL — SIGNIFICANT CHANGE UP (ref 4.2–5.8)
RBC # FLD: 13.2 % — SIGNIFICANT CHANGE UP (ref 10.3–14.5)
SODIUM SERPL-SCNC: 137 MMOL/L — SIGNIFICANT CHANGE UP (ref 135–145)
TROPONIN T SERPL-MCNC: <0.01 NG/ML — SIGNIFICANT CHANGE UP (ref 0–0.06)
WBC # BLD: 6.74 K/UL — SIGNIFICANT CHANGE UP (ref 3.8–10.5)
WBC # FLD AUTO: 6.74 K/UL — SIGNIFICANT CHANGE UP (ref 3.8–10.5)

## 2018-01-19 PROCEDURE — 70545 MR ANGIOGRAPHY HEAD W/DYE: CPT | Mod: 26,59

## 2018-01-19 PROCEDURE — 70552 MRI BRAIN STEM W/DYE: CPT | Mod: 26

## 2018-01-19 PROCEDURE — 70548 MR ANGIOGRAPHY NECK W/DYE: CPT | Mod: 26

## 2018-01-19 PROCEDURE — 99233 SBSQ HOSP IP/OBS HIGH 50: CPT

## 2018-01-19 RX ORDER — MECLIZINE HCL 12.5 MG
12.5 TABLET ORAL
Qty: 0 | Refills: 0 | Status: DISCONTINUED | OUTPATIENT
Start: 2018-01-19 | End: 2018-01-21

## 2018-01-19 RX ORDER — LOPERAMIDE HCL 2 MG
2 TABLET ORAL EVERY 4 HOURS
Qty: 0 | Refills: 0 | Status: DISCONTINUED | OUTPATIENT
Start: 2018-01-19 | End: 2018-01-21

## 2018-01-19 RX ADMIN — Medication 81 MILLIGRAM(S): at 11:18

## 2018-01-19 RX ADMIN — Medication 12.5 MILLIGRAM(S): at 18:31

## 2018-01-19 RX ADMIN — Medication 5 MILLIGRAM(S): at 23:12

## 2018-01-19 RX ADMIN — Medication 50 MILLIGRAM(S): at 08:59

## 2018-01-19 NOTE — DISCHARGE NOTE ADULT - PATIENT PORTAL LINK FT
“You can access the [facility] Patient Portal, offered by [facility], by registering with the following website: [email or web address]”

## 2018-01-19 NOTE — PROGRESS NOTE ADULT - SUBJECTIVE AND OBJECTIVE BOX
BENNETT RODRIGUEZ  72y Male  MRN:[id number]    Patient is a 72y old  Male who presents with a chief complaint of Dizziness for the past 7 days (18 Jan 2018 02:55)    HPI:  NIGHT HOSPITALIST:  Patient UNKNOWN to me previously, assigned to me at this point via the ER and by Dr. 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  Patient noted some relief with meclizine given in the ER but patient declines further doses.  Patient denies HA, focal weakness.  No chest pain/pressure.  No dyspnea at present, but admits to exercise limited symptoms.  No abdominal pain, no red blood per rectum or melena.  NO back pain, no tearing back pain.  No fever, no chills, no rigors.  NO hematuria, no dysuria.  Patient admits to increased thirst and urination but reports a normal random glucose.  No weight loss or anorexia.  No sick contacts or recent travel.  Remaining review of systems not contributory. (18 Jan 2018 02:55)      Patient seen and evaluated at bedside. No acute events overnight except as noted.    Interval HPI: still with c/o dizziness.     PAST MEDICAL & SURGICAL HISTORY:  Coronary artery disease  S/P CABG x 4      REVIEW OF SYSTEMS:  as per hpi    VITALS:  Vital Signs Last 24 Hrs  T(C): 36.3 (19 Jan 2018 12:22), Max: 36.7 (18 Jan 2018 20:08)  T(F): 97.3 (19 Jan 2018 12:22), Max: 98.1 (19 Jan 2018 01:37)  HR: 77 (19 Jan 2018 08:32) (65 - 77)  BP: 168/91 (19 Jan 2018 08:32) (144/83 - 171/102)  BP(mean): --  RR: 18 (19 Jan 2018 12:22) (18 - 18)  SpO2: 95% (19 Jan 2018 12:22) (95% - 99%)    PHYSICAL EXAM:  GENERAL: NAD, well-developed  HEAD:  Atraumatic, Normocephalic  EYES: EOMI, PERRLA, conjunctiva and sclera clear  NECK: Supple, No JVD  CHEST/LUNG: Clear to auscultation bilaterally; No wheeze  HEART: S1, S2; No murmurs, rubs, or gallops  ABDOMEN: Soft, Nontender, Nondistended; Bowel sounds present  EXTREMITIES:  2+ Peripheral Pulses, No clubbing, cyanosis, or edema  PSYCH: Normal affect  NEUROLOGY: AAOX3; non-focal  SKIN: No rashes or lesions    Consultant(s) Notes Reviewed:  [x ] YES  [ ] NO  Care Discussed with Consultants/Other Providers [ x] YES  [ ] NO    MEDS:  MEDICATIONS  (STANDING):  aspirin enteric coated 81 milliGRAM(s) Oral daily  heparin  Injectable 5000 Unit(s) SubCutaneous every 8 hours  influenza   Vaccine 0.5 milliLiter(s) IntraMuscular once  metoprolol succinate ER 50 milliGRAM(s) Oral daily  rosuvastatin 5 milliGRAM(s) Oral at bedtime    MEDICATIONS  (PRN):  acetaminophen   Tablet. 650 milliGRAM(s) Oral every 6 hours PRN Mild Pain (1 - 3)  diazepam    Tablet 5 milliGRAM(s) Oral every 8 hours PRN BPPV    ALLERGIES:  No Known Allergies      LABS:             < from: MR Head w/ IV Cont (01.19.18 @ 01:10) >    IMPRESSION:      1. Microvascular ischemic change and mild age-appropriate involutional   change  2. Unremarkable MR angiographic evaluation of the cervical arteries and   [place].      < end of copied text >  < from: Transthoracic Echocardiogram (01.18.18 @ 14:44) >  -----------------  Conclusions:  1. Normal mitral valve. Mild mitral regurgitation.  2. Thickened, trileaflet aortic valve. Peak transaortic  valve gradient equals 5 mm Hg, mean transaortic valve  gradient equals 3 mm Hg, estimated aortic valve area equals  1.7 sqcm (by continuity equation), aortic valve velocity  time integral equals 24 cm, consistent with mild aortic  stenosis. Minimal aortic regurgitation.  3. Endocardium not well visualized; grossly preserved left  ventricular systolic function. Septal motion consistent  with cardiac surgery. There is mild hypokinesis if the  basal inferolateral wall.  4. Normal diastolic function  5. The right ventricle is not well visualized; grossly  normal right ventricular systolic function.  *** No previous Echo exam.  -------------------------------------------------------------    < end of copied text >

## 2018-01-19 NOTE — DISCHARGE NOTE ADULT - PLAN OF CARE
Remain without dizziness HOME CARE INSTRUCTIONS  Have someone stay with you until you feel stable.  Do not drive, operate machinery, or play sports until your caregiver says it is okay.  Keep all follow-up appointments as directed by your caregiver.   Lie down right away if you start feeling like you might faint. Breathe deeply and steadily. Wait until all the symptoms have passed.Drink enough fluids to keep your urine clear or pale yellow.  If you are taking blood pressure or heart medicine, get up slowly, taking several minutes to sit and then stand. This can reduce dizziness.  SEEK IMMEDIATE MEDICAL CARE IF:  You have a severe headache.  You have unusual pain in the chest, abdomen, or back.  You are bleeding from the mouth or rectum, or you have black or tarry stool.  You have an irregular or very fast heartbeat.  You have pain with breathing.  You have repeated fainting or seizure-like jerking during an episode.  You faint when sitting or lying down.  You have confusion.  You have difficulty walking.  You have severe weakness.  You have vision problems.  If you fainted, call your local emergency services. Do not drive yourself to the hospital Coronary artery disease is a condition where the arteries the supply the heart muscle get clogged with fatty deposits & puts you at risk for a heart attack  Call your doctor if you have any new pain, pressure, or discomfort in the center of your chest, pain, tingling or discomfort in arms, back, neck, jaw, or stomach, shortness of breath, nausea, vomiting, burping or heartburn, sweating, cold and clammy skin, racing or abnormal heartbeat for more than 10 minutes or if they keep coming & going.  Call 911 and do not tr to get to hospital by care  You can help yourself with lifestyle changes (quitting smoking if you smoke), eat lots of fruits & vegetables & low fat dairy products, not a lot of meat & fatty foods, walk or some form of physical activity most days of the week, lose weight if you are overweight  Take your cardiac medication as prescribed to lower cholesterol, to lower blood pressure, aspirin to prevent blood clots, and diabetes control  Make sure to keep appointments with doctor for cardiac follow up care Continue current medications  Follow up with your PMD in 1 week

## 2018-01-19 NOTE — DISCHARGE NOTE ADULT - ADDITIONAL INSTRUCTIONS
Make appointments to follow up with your out patient physicians.  Bring all discharge paperwork including discharge medication list to your follow up appointments.   Follow up with Dr. Gao next week.

## 2018-01-19 NOTE — PROGRESS NOTE ADULT - ASSESSMENT
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  Exam nonfocal.  Presumed right BPPV, though with risk factors need to rule out posterior circulation stroke.     MRI brain and MRA head/neck neg     dx is right BPPV    - if symptoms persist as outpt can f/u for vestibular rehab with repositioning maneuvers  - otherwise no neuro objection to d/c home

## 2018-01-19 NOTE — DISCHARGE NOTE ADULT - HOSPITAL COURSE
73 y/o man with hx of HTN, CAD s/p CABG p/w 7 days of vertigo neurological exam unremarkable with exception of DHP with notable nystagmus when done on the right side. Diagnosis likely BPPV, low suspicion of central cause for vertigo given lack of other findings on exam.     Dx:  Dizziness,  Unsteady          Orthostatic hypotension          Benign Paroxysmal vertigo  1/18 (+) orthostatic, refusing meclizine  1/19 MRI head/neck: No acute intracranial/emergent pathology/findings  	echo: preserved LV  	agreeable to meclizine, started, orthostatic hypotension improved.  D/C to home.

## 2018-01-19 NOTE — DISCHARGE NOTE ADULT - CARE PROVIDER_API CALL
Sam Gao), Cardiovascular Disease; Internal Medicine  [address] [address]  Phone: [phone]  Fax: [phone]

## 2018-01-19 NOTE — PROGRESS NOTE ADULT - ASSESSMENT
73 yo male history as noted including CAD, htn, a/w dizziness    neuro f/u noted  mri/a noted  likely BPPC  meclizine prn  outpt neuro f/u    cards f/u  echo noted    cont home cardiac meds  monitor bp    dvt ppx  PT    dc planning.  outpt f/u with pmd  pt reports unable to get ride home today. to leave in AM

## 2018-01-19 NOTE — DISCHARGE NOTE ADULT - MEDICATION SUMMARY - MEDICATIONS TO TAKE
I will START or STAY ON the medications listed below when I get home from the hospital:    aspirin 81 mg oral delayed release tablet  -- 1 tab(s) by mouth once a day  -- Indication: For Coronary artery disease    diazePAM 5 mg oral tablet  -- 1 tab(s) by mouth every 8 hours, As needed, BPPV MDD:3 tabs  -- Indication: For anxiety    meclizine 12.5 mg oral tablet  -- 1 tab(s) by mouth 2 times a day  -- Indication: For Benign paroxysmal positional vertigo    Crestor 5 mg oral tablet  -- 1 tab(s) by mouth once a day (at bedtime)  -- Indication: For Coronary artery disease involving coronary bypass graft of native heart without angina pectoris    Toprol-XL 50 mg oral tablet, extended release  -- 75  by mouth 2 times a day  -- Indication: For HTN

## 2018-01-19 NOTE — PROGRESS NOTE ADULT - SUBJECTIVE AND OBJECTIVE BOX
HPI:  Patient continues to report symptoms of vertiginous dizziness.    Review Of Systems:     No chest pain, shortness of breath, or palpitations            Medications:  acetaminophen   Tablet. 650 milliGRAM(s) Oral every 6 hours PRN  aspirin enteric coated 81 milliGRAM(s) Oral daily  diazepam    Tablet 5 milliGRAM(s) Oral every 8 hours PRN  heparin  Injectable 5000 Unit(s) SubCutaneous every 8 hours  influenza   Vaccine 0.5 milliLiter(s) IntraMuscular once  loperamide 2 milliGRAM(s) Oral every 4 hours PRN  meclizine 12.5 milliGRAM(s) Oral two times a day  metoprolol succinate ER 50 milliGRAM(s) Oral daily  rosuvastatin 5 milliGRAM(s) Oral at bedtime    PAST MEDICAL & SURGICAL HISTORY:  Coronary artery disease  S/P CABG x 4    Vitals:  T(C): 36.7 (01-19-18 @ 16:53), Max: 36.7 (01-18-18 @ 20:08)  HR: 69 (01-19-18 @ 16:53) (65 - 77)  BP: 110/71 (01-19-18 @ 16:53) (110/71 - 171/102)  BP(mean): --  RR: 18 (01-19-18 @ 16:53) (18 - 18)  SpO2: 97% (01-19-18 @ 16:53) (95% - 99%)  Wt(kg): --  Daily     Daily   I&O's Summary    18 Jan 2018 07:01  -  19 Jan 2018 07:00  --------------------------------------------------------  IN: 1060 mL / OUT: 2000 mL / NET: -940 mL    19 Jan 2018 07:01  -  19 Jan 2018 19:45  --------------------------------------------------------  IN: 740 mL / OUT: 650 mL / NET: 90 mL        Physical Exam:  Appearance: No acute distress; well appearing  Eyes: PERRL, EOMI, pink conjunctiva  HENT: Normal oral mucosa  Cardiovascular: RRR, S1, S2, no murmurs, rubs, or gallops; no edema; no JVD  Respiratory: Clear to auscultation bilaterally  Gastrointestinal: soft, non-tender, non-distended with normal bowel sounds  Musculoskeletal: No clubbing; no joint deformity   Neurologic: Non-focal  Lymphatic: No lymphadenopathy  Psychiatry: AAOx3, mood & affect appropriate  Skin: No rashes, ecchymoses, or cyanosis                          15.2   6.74  )-----------( 217      ( 19 Jan 2018 09:01 )             45.6     01-19    137  |  97  |  10  ----------------------------<  86  3.7   |  27  |  0.89    Ca    9.4      19 Jan 2018 09:01      PT/INR - ( 18 Jan 2018 07:56 )   PT: 11.4 sec;   INR: 1.01 ratio         PTT - ( 18 Jan 2018 07:56 )  PTT:27.5 sec  CARDIAC MARKERS ( 19 Jan 2018 09:01 )  x     / x     / 151 U/L / x     / x      CARDIAC MARKERS ( 19 Jan 2018 07:10 )  x     / <0.01 ng/mL / x     / x     / x      CARDIAC MARKERS ( 18 Jan 2018 19:37 )  x     / x     / 149 U/L / x     / x      CARDIAC MARKERS ( 18 Jan 2018 17:29 )  x     / <0.01 ng/mL / x     / x     / 2.0 ng/mL  CARDIAC MARKERS ( 18 Jan 2018 05:43 )  x     / <0.01 ng/mL / 122 U/L / x     / 1.7 ng/mL        Echo: < from: Transthoracic Echocardiogram (01.18.18 @ 14:44) >  ------------------------------------------------------------------------  Conclusions:  1. Normal mitral valve. Mild mitral regurgitation.  2. Thickened, trileaflet aortic valve. Peak transaortic  valve gradient equals 5 mm Hg, mean transaortic valve  gradient equals 3 mm Hg, estimated aortic valve area equals  1.7 sqcm (by continuity equation), aortic valve velocity  time integral equals 24 cm, consistent with mild aortic  stenosis. Minimal aortic regurgitation.  3. Endocardium not well visualized; grossly preserved left  ventricular systolic function. Septal motion consistent  with cardiac surgery. There is mild hypokinesis if the  basal inferolateral wall.  4. Normal diastolic function  5. The right ventricle is not well visualized; grossly  normal right ventricular systolic function.  *** No previous Echo exam.  ------------------------------------------------------------------------  Confirmed on  1/19/2018 - 13:39:26 by Kenan Glover M.D.  ------------------------------------------------------------------------    < end of copied text >      Imaging: < from: MR Head w/ IV Cont (01.19.18 @ 01:10) >  IMPRESSION:      1. Microvascular ischemic change and mild age-appropriate involutional   change  2. Unremarkable MR angiographic evaluation of the cervical arteries and   [place].    LARISSA MARTINEZ M.D., ATTENDING RADIOLOGIST  This document has been electronically signed. Jan 19 2018  8:58AM        < end of copied text >    Interpretation of Telemetry: NSR, occasional PVC

## 2018-01-19 NOTE — PROVIDER CONTACT NOTE (OTHER) - ASSESSMENT
Pt A&Ox4. Pt still states that the patient is feeling dizzy laying down. Pt states, "I just want to sleep and I will take my blood pressure medicine after breakfast." Pt is normal sinus rhythm on tele.

## 2018-01-19 NOTE — PROGRESS NOTE ADULT - ASSESSMENT
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 He presented to the [facility] ED with complaints of persistent and debilitating dizziness. The dizziness is described as positional vertigo.  Echo unrevealing of etiology.  Suspect BPPV.    Consider repeat work-up for pheochromocytoma in patient who remains hypertensive despite escalating doses of beta-blocker.  Appreciate neurology recommendations. Imaging of brain (MRI/MRA) pending.    Discharge planning per primary team.  Follow-up with Trevon Lowery or Sam Gao as outpatient.

## 2018-01-19 NOTE — PROGRESS NOTE ADULT - SUBJECTIVE AND OBJECTIVE BOX
SUBJECTIVE: Had episode of vertigo when sitting up from MRI    Medications:  MEDICATIONS  (STANDING):  aspirin enteric coated 81 milliGRAM(s) Oral daily  heparin  Injectable 5000 Unit(s) SubCutaneous every 8 hours  influenza   Vaccine 0.5 milliLiter(s) IntraMuscular once  metoprolol succinate ER 50 milliGRAM(s) Oral daily  rosuvastatin 5 milliGRAM(s) Oral at bedtime    MEDICATIONS  (PRN):  acetaminophen   Tablet. 650 milliGRAM(s) Oral every 6 hours PRN Mild Pain (1 - 3)  diazepam    Tablet 5 milliGRAM(s) Oral every 8 hours PRN BPPV      Labs:  CBC Full  -  ( 18 Jan 2018 07:57 )  WBC Count : 6.49 K/uL  Hemoglobin : 14.2 g/dL  Hematocrit : 42.2 %  Platelet Count - Automated : 201 K/uL  Mean Cell Volume : 90.9 fl  Mean Cell Hemoglobin : 30.6 pg  Mean Cell Hemoglobin Concentration : 33.6 gm/dL  Auto Neutrophil # : 3.15 K/uL  Auto Lymphocyte # : 2.75 K/uL  Auto Monocyte # : 0.52 K/uL  Auto Eosinophil # : 0.04 K/uL  Auto Basophil # : 0.02 K/uL  Auto Neutrophil % : 48.5 %  Auto Lymphocyte % : 42.4 %  Auto Monocyte % : 8.0 %  Auto Eosinophil % : 0.6 %  Auto Basophil % : 0.3 %    01-18    141  |  103  |  9   ----------------------------<  92  4.0   |  24  |  1.15    Ca    9.1      18 Jan 2018 08:07    TPro  7.7  /  Alb  4.5  /  TBili  1.2  /  DBili  x   /  AST  22  /  ALT  16  /  AlkPhos  72  01-17    CAPILLARY BLOOD GLUCOSE        LIVER FUNCTIONS - ( 17 Jan 2018 16:47 )  Alb: 4.5 g/dL / Pro: 7.7 g/dL / ALK PHOS: 72 U/L / ALT: 16 U/L RC / AST: 22 U/L / GGT: x           PT/INR - ( 18 Jan 2018 07:56 )   PT: 11.4 sec;   INR: 1.01 ratio         PTT - ( 18 Jan 2018 07:56 )  PTT:27.5 sec  Urinalysis Basic - ( 17 Jan 2018 19:19 )    Color: Colorless / Appearance: Clear / SG: <1.005 / pH: x  Gluc: x / Ketone: Negative  / Bili: Negative / Urobili: Negative   Blood: x / Protein: Negative / Nitrite: Negative   Leuk Esterase: Negative / RBC: x / WBC x   Sq Epi: x / Non Sq Epi: Occasional /HPF / Bacteria: x        Vitals:  Vital Signs Last 24 Hrs  T(C): 36.4 (19 Jan 2018 08:32), Max: 36.7 (18 Jan 2018 12:23)  T(F): 97.5 (19 Jan 2018 08:32), Max: 98.1 (18 Jan 2018 12:23)  HR: 77 (19 Jan 2018 08:32) (65 - 77)  BP: 168/91 (19 Jan 2018 08:32) (144/83 - 171/102)  BP(mean): --  RR: 18 (19 Jan 2018 08:32) (18 - 18)  SpO2: 96% (19 Jan 2018 08:32) (96% - 99%)      General Exam:   General appearance: No acute distress    Cardiac: RRR  Pulm:        breathing comfortably         Neurological Exam:  Mental Status: Orientated to self, date and place.  Attention intact.  No dysarthria. Speech fluent.  Cranial Nerves:   PERRL, EOMI, VFF, no nystagmus.    CN V1-3 intact to light touch .  No facial asymmetry.  Hearing intact bilaterally.  Tongue  midline.  Sternocleidomastoid and Trapezius intact bilaterally.    Motor:   Tone: normal.                  Strength:     [] Upper extremity                      Delt       Bicep    Tricep                                                  R         5/5        5/5        5/5       5/5                                               L          5/5        5/5        5/5       5/5  [] Lower extremity                       HF          KE          KF        DF         PF                                               R        5/5        5/5        5/5       5/5       5/5                                               L         5/5        5/5       5/5       5/5        5/5             Dysmetria: None to finger-nose-finger or heel-shin-heel  No truncal ataxia.    Tremor: No resting, postural or action tremor.  No myoclonus.    Sensation: intact to light touch, pinprick, vibration and proprioception    Deep Tendon Reflexes:     Biceps          Triceps      BR        Patellar        Ankle         Babinski                                  R       2+                   2+           2+            2+               2+           downgoing                                  L        2+                  2+           2+            2+               2+           downgoing    Gait: normal.  Difficulty with tandem which pt attributed to being lightheaded from not eating.     Other: [place] maneuver Positive to  the Right, subsided after 20 seconds    01-17    134<L>  |  94<L>  |  14  ----------------------------<  100<H>  3.8   |  29  |  1.26    Ca    9.3      17 Jan 2018 16:47    TPro  7.7  /  Alb  4.5  /  TBili  1.2  /  DBili  x   /  AST  22  /  ALT  16  /  AlkPhos  72  01-17                            15.2   6.1   )-----------( 200      ( 17 Jan 2018 16:47 )             43.1

## 2018-01-19 NOTE — DISCHARGE NOTE ADULT - SECONDARY DIAGNOSIS.
Benign paroxysmal positional vertigo Coronary artery disease involving coronary bypass graft of native heart without angina pectoris

## 2018-01-19 NOTE — DISCHARGE NOTE ADULT - CARE PLAN
Principal Discharge DX:	Dizziness  Goal:	Remain without dizziness  Secondary Diagnosis:	Benign paroxysmal positional vertigo  Assessment and plan of treatment:	HOME CARE INSTRUCTIONS  Have someone stay with you until you feel stable.  Do not drive, operate machinery, or play sports until your caregiver says it is okay.  Keep all follow-up appointments as directed by your caregiver.   Lie down right away if you start feeling like you might faint. Breathe deeply and steadily. Wait until all the symptoms have passed.Drink enough fluids to keep your urine clear or pale yellow.  If you are taking blood pressure or heart medicine, get up slowly, taking several minutes to sit and then stand. This can reduce dizziness.  SEEK IMMEDIATE MEDICAL CARE IF:  You have a severe headache.  You have unusual pain in the chest, abdomen, or back.  You are bleeding from the mouth or rectum, or you have black or tarry stool.  You have an irregular or very fast heartbeat.  You have pain with breathing.  You have repeated fainting or seizure-like jerking during an episode.  You faint when sitting or lying down.  You have confusion.  You have difficulty walking.  You have severe weakness.  You have vision problems.  If you fainted, call your local emergency services. Do not drive yourself to the hospital  Secondary Diagnosis:	Coronary artery disease involving coronary bypass graft of native heart without angina pectoris  Assessment and plan of treatment:	Coronary artery disease is a condition where the arteries the supply the heart muscle get clogged with fatty deposits & puts you at risk for a heart attack  Call your doctor if you have any new pain, pressure, or discomfort in the center of your chest, pain, tingling or discomfort in arms, back, neck, jaw, or stomach, shortness of breath, nausea, vomiting, burping or heartburn, sweating, cold and clammy skin, racing or abnormal heartbeat for more than 10 minutes or if they keep coming & going.  Call 911 and do not tr to get to hospital by care  You can help yourself with lifestyle changes (quitting smoking if you smoke), eat lots of fruits & vegetables & low fat dairy products, not a lot of meat & fatty foods, walk or some form of physical activity most days of the week, lose weight if you are overweight  Take your cardiac medication as prescribed to lower cholesterol, to lower blood pressure, aspirin to prevent blood clots, and diabetes control  Make sure to keep appointments with doctor for cardiac follow up care Principal Discharge DX:	Dizziness  Goal:	Remain without dizziness  Assessment and plan of treatment:	Continue current medications  Follow up with your PMD in 1 week  Secondary Diagnosis:	Benign paroxysmal positional vertigo  Assessment and plan of treatment:	HOME CARE INSTRUCTIONS  Have someone stay with you until you feel stable.  Do not drive, operate machinery, or play sports until your caregiver says it is okay.  Keep all follow-up appointments as directed by your caregiver.   Lie down right away if you start feeling like you might faint. Breathe deeply and steadily. Wait until all the symptoms have passed.Drink enough fluids to keep your urine clear or pale yellow.  If you are taking blood pressure or heart medicine, get up slowly, taking several minutes to sit and then stand. This can reduce dizziness.  SEEK IMMEDIATE MEDICAL CARE IF:  You have a severe headache.  You have unusual pain in the chest, abdomen, or back.  You are bleeding from the mouth or rectum, or you have black or tarry stool.  You have an irregular or very fast heartbeat.  You have pain with breathing.  You have repeated fainting or seizure-like jerking during an episode.  You faint when sitting or lying down.  You have confusion.  You have difficulty walking.  You have severe weakness.  You have vision problems.  If you fainted, call your local emergency services. Do not drive yourself to the hospital  Secondary Diagnosis:	Coronary artery disease involving coronary bypass graft of native heart without angina pectoris  Assessment and plan of treatment:	Coronary artery disease is a condition where the arteries the supply the heart muscle get clogged with fatty deposits & puts you at risk for a heart attack  Call your doctor if you have any new pain, pressure, or discomfort in the center of your chest, pain, tingling or discomfort in arms, back, neck, jaw, or stomach, shortness of breath, nausea, vomiting, burping or heartburn, sweating, cold and clammy skin, racing or abnormal heartbeat for more than 10 minutes or if they keep coming & going.  Call 911 and do not tr to get to hospital by care  You can help yourself with lifestyle changes (quitting smoking if you smoke), eat lots of fruits & vegetables & low fat dairy products, not a lot of meat & fatty foods, walk or some form of physical activity most days of the week, lose weight if you are overweight  Take your cardiac medication as prescribed to lower cholesterol, to lower blood pressure, aspirin to prevent blood clots, and diabetes control  Make sure to keep appointments with doctor for cardiac follow up care

## 2018-01-20 LAB
ANION GAP SERPL CALC-SCNC: 16 MMOL/L — SIGNIFICANT CHANGE UP (ref 5–17)
BUN SERPL-MCNC: 18 MG/DL — SIGNIFICANT CHANGE UP (ref 7–23)
CALCIUM SERPL-MCNC: 9.5 MG/DL — SIGNIFICANT CHANGE UP (ref 8.4–10.5)
CHLORIDE SERPL-SCNC: 97 MMOL/L — SIGNIFICANT CHANGE UP (ref 96–108)
CO2 SERPL-SCNC: 23 MMOL/L — SIGNIFICANT CHANGE UP (ref 22–31)
CREAT SERPL-MCNC: 1.2 MG/DL — SIGNIFICANT CHANGE UP (ref 0.5–1.3)
GLUCOSE SERPL-MCNC: 84 MG/DL — SIGNIFICANT CHANGE UP (ref 70–99)
HCT VFR BLD CALC: 43 % — SIGNIFICANT CHANGE UP (ref 39–50)
HGB BLD-MCNC: 14.2 G/DL — SIGNIFICANT CHANGE UP (ref 13–17)
MCHC RBC-ENTMCNC: 30.5 PG — SIGNIFICANT CHANGE UP (ref 27–34)
MCHC RBC-ENTMCNC: 33 GM/DL — SIGNIFICANT CHANGE UP (ref 32–36)
MCV RBC AUTO: 92.3 FL — SIGNIFICANT CHANGE UP (ref 80–100)
PLATELET # BLD AUTO: 191 K/UL — SIGNIFICANT CHANGE UP (ref 150–400)
POTASSIUM SERPL-MCNC: 3.8 MMOL/L — SIGNIFICANT CHANGE UP (ref 3.5–5.3)
POTASSIUM SERPL-SCNC: 3.8 MMOL/L — SIGNIFICANT CHANGE UP (ref 3.5–5.3)
RBC # BLD: 4.66 M/UL — SIGNIFICANT CHANGE UP (ref 4.2–5.8)
RBC # FLD: 13.1 % — SIGNIFICANT CHANGE UP (ref 10.3–14.5)
SODIUM SERPL-SCNC: 136 MMOL/L — SIGNIFICANT CHANGE UP (ref 135–145)
WBC # BLD: 6.94 K/UL — SIGNIFICANT CHANGE UP (ref 3.8–10.5)
WBC # FLD AUTO: 6.94 K/UL — SIGNIFICANT CHANGE UP (ref 3.8–10.5)

## 2018-01-20 RX ORDER — SODIUM CHLORIDE 9 MG/ML
1000 INJECTION INTRAMUSCULAR; INTRAVENOUS; SUBCUTANEOUS ONCE
Qty: 0 | Refills: 0 | Status: COMPLETED | OUTPATIENT
Start: 2018-01-20 | End: 2018-01-20

## 2018-01-20 RX ADMIN — Medication 81 MILLIGRAM(S): at 11:22

## 2018-01-20 RX ADMIN — Medication 12.5 MILLIGRAM(S): at 21:40

## 2018-01-20 RX ADMIN — SODIUM CHLORIDE 2000 MILLILITER(S): 9 INJECTION INTRAMUSCULAR; INTRAVENOUS; SUBCUTANEOUS at 21:00

## 2018-01-20 RX ADMIN — Medication 5 MILLIGRAM(S): at 23:20

## 2018-01-20 RX ADMIN — Medication 50 MILLIGRAM(S): at 08:56

## 2018-01-20 RX ADMIN — Medication 12.5 MILLIGRAM(S): at 08:54

## 2018-01-20 NOTE — PROGRESS NOTE ADULT - ASSESSMENT
73 yo male history as noted including CAD, htn, a/w dizziness    neuro f/u noted  mri/a noted  likely BPPv  meclizine prn  outpt neuro f/u    +orthostatics  iv hydration  recheck in am    cards f/u  echo noted    cont home cardiac meds  monitor bp    dvt ppx  PT

## 2018-01-20 NOTE — PROGRESS NOTE ADULT - SUBJECTIVE AND OBJECTIVE BOX
BENNETT RODRIGUEZ  72y Male  MRN:[id number]    Patient is a 72y old  Male who presents with a chief complaint of Dizziness for the past 7 days (18 Jan 2018 02:55)    HPI:  NIGHT HOSPITALIST:  Patient UNKNOWN to me previously, assigned to me at this point via the ER and by Dr. 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  Patient noted some relief with meclizine given in the ER but patient declines further doses.  Patient denies HA, focal weakness.  No chest pain/pressure.  No dyspnea at present, but admits to exercise limited symptoms.  No abdominal pain, no red blood per rectum or melena.  NO back pain, no tearing back pain.  No fever, no chills, no rigors.  NO hematuria, no dysuria.  Patient admits to increased thirst and urination but reports a normal random glucose.  No weight loss or anorexia.  No sick contacts or recent travel.  Remaining review of systems not contributory. (18 Jan 2018 02:55)      Patient seen and evaluated at bedside. No acute events overnight except as noted.    Interval HPI: still with c/o dizziness and now with +orthostatics    PAST MEDICAL & SURGICAL HISTORY:  Coronary artery disease  S/P CABG x 4      REVIEW OF SYSTEMS:  as per hpi    VITALS:  Vital Signs Last 24 Hrs  T(C): 36.7 (20 Jan 2018 13:37), Max: 36.7 (20 Jan 2018 04:41)  T(F): 98 (20 Jan 2018 13:37), Max: 98.1 (20 Jan 2018 04:41)  HR: 69 (20 Jan 2018 13:37) (69 - 78)  BP: 123/77 (20 Jan 2018 13:37) (123/77 - 145/84)  BP(mean): --  RR: 18 (20 Jan 2018 13:37) (18 - 18)  SpO2: 96% (20 Jan 2018 13:37) (96% - 98%)     PHYSICAL EXAM:  GENERAL: NAD, well-developed  HEAD:  Atraumatic, Normocephalic  EYES: EOMI, PERRLA, conjunctiva and sclera clear  NECK: Supple, No JVD  CHEST/LUNG: Clear to auscultation bilaterally; No wheeze  HEART: S1, S2; No murmurs, rubs, or gallops  ABDOMEN: Soft, Nontender, Nondistended; Bowel sounds present  EXTREMITIES:  2+ Peripheral Pulses, No clubbing, cyanosis, or edema  PSYCH: Normal affect  NEUROLOGY: AAOX3; non-focal  SKIN: No rashes or lesions    Consultant(s) Notes Reviewed:  [x ] YES  [ ] NO  Care Discussed with Consultants/Other Providers [ x] YES  [ ] NO    MEDS:  MEDICATIONS  (STANDING):  aspirin enteric coated 81 milliGRAM(s) Oral daily  heparin  Injectable 5000 Unit(s) SubCutaneous every 8 hours  influenza   Vaccine 0.5 milliLiter(s) IntraMuscular once  meclizine 12.5 milliGRAM(s) Oral two times a day  metoprolol succinate ER 50 milliGRAM(s) Oral daily  rosuvastatin 5 milliGRAM(s) Oral at bedtime  sodium chloride 0.9% Bolus 1000 milliLiter(s) IV Bolus once    MEDICATIONS  (PRN):  acetaminophen   Tablet. 650 milliGRAM(s) Oral every 6 hours PRN Mild Pain (1 - 3)  diazepam    Tablet 5 milliGRAM(s) Oral every 8 hours PRN BPPV  loperamide 2 milliGRAM(s) Oral every 4 hours PRN Diarrhea      ALLERGIES:  No Known Allergies      LABS:             < from: MR Head w/ IV Cont (01.19.18 @ 01:10) >    IMPRESSION:      1. Microvascular ischemic change and mild age-appropriate involutional   change  2. Unremarkable MR angiographic evaluation of the cervical arteries and   [place].      < end of copied text >  < from: Transthoracic Echocardiogram (01.18.18 @ 14:44) >  -----------------  Conclusions:  1. Normal mitral valve. Mild mitral regurgitation.  2. Thickened, trileaflet aortic valve. Peak transaortic  valve gradient equals 5 mm Hg, mean transaortic valve  gradient equals 3 mm Hg, estimated aortic valve area equals  1.7 sqcm (by continuity equation), aortic valve velocity  time integral equals 24 cm, consistent with mild aortic  stenosis. Minimal aortic regurgitation.  3. Endocardium not well visualized; grossly preserved left  ventricular systolic function. Septal motion consistent  with cardiac surgery. There is mild hypokinesis if the  basal inferolateral wall.  4. Normal diastolic function  5. The right ventricle is not well visualized; grossly  normal right ventricular systolic function.  *** No previous Echo exam.  -------------------------------------------------------------    < end of copied text >

## 2018-01-20 NOTE — PROVIDER CONTACT NOTE (OTHER) - ASSESSMENT
pt aox4. felt "winded, dizzy & could feel his heart racing. pt denies room spinning, cp, sob, or feeling of passing out.  orthos 149/69 HR 80, sit  134/84 HR 92, stand  118/82 HR 99

## 2018-01-21 VITALS
TEMPERATURE: 97 F | HEART RATE: 67 BPM | RESPIRATION RATE: 18 BRPM | SYSTOLIC BLOOD PRESSURE: 149 MMHG | DIASTOLIC BLOOD PRESSURE: 83 MMHG | OXYGEN SATURATION: 96 %

## 2018-01-21 PROCEDURE — 70544 MR ANGIOGRAPHY HEAD W/O DYE: CPT

## 2018-01-21 PROCEDURE — 85027 COMPLETE CBC AUTOMATED: CPT

## 2018-01-21 PROCEDURE — 93306 TTE W/DOPPLER COMPLETE: CPT

## 2018-01-21 PROCEDURE — 80053 COMPREHEN METABOLIC PANEL: CPT

## 2018-01-21 PROCEDURE — 82550 ASSAY OF CK (CPK): CPT

## 2018-01-21 PROCEDURE — A9585: CPT

## 2018-01-21 PROCEDURE — 82553 CREATINE MB FRACTION: CPT

## 2018-01-21 PROCEDURE — 99285 EMERGENCY DEPT VISIT HI MDM: CPT | Mod: 25

## 2018-01-21 PROCEDURE — 85730 THROMBOPLASTIN TIME PARTIAL: CPT

## 2018-01-21 PROCEDURE — 70552 MRI BRAIN STEM W/DYE: CPT

## 2018-01-21 PROCEDURE — 70548 MR ANGIOGRAPHY NECK W/DYE: CPT

## 2018-01-21 PROCEDURE — 82533 TOTAL CORTISOL: CPT

## 2018-01-21 PROCEDURE — 83036 HEMOGLOBIN GLYCOSYLATED A1C: CPT

## 2018-01-21 PROCEDURE — 84484 ASSAY OF TROPONIN QUANT: CPT

## 2018-01-21 PROCEDURE — 80048 BASIC METABOLIC PNL TOTAL CA: CPT

## 2018-01-21 PROCEDURE — 71046 X-RAY EXAM CHEST 2 VIEWS: CPT

## 2018-01-21 PROCEDURE — 81001 URINALYSIS AUTO W/SCOPE: CPT

## 2018-01-21 PROCEDURE — 90686 IIV4 VACC NO PRSV 0.5 ML IM: CPT

## 2018-01-21 PROCEDURE — 85610 PROTHROMBIN TIME: CPT

## 2018-01-21 RX ORDER — DIAZEPAM 5 MG
1 TABLET ORAL
Qty: 15 | Refills: 0 | OUTPATIENT
Start: 2018-01-21 | End: 2018-01-25

## 2018-01-21 RX ADMIN — INFLUENZA VIRUS VACCINE 0.5 MILLILITER(S): 15; 15; 15; 15 SUSPENSION INTRAMUSCULAR at 15:56

## 2018-01-21 RX ADMIN — Medication 81 MILLIGRAM(S): at 11:45

## 2018-01-21 RX ADMIN — Medication 12.5 MILLIGRAM(S): at 09:01

## 2018-01-21 RX ADMIN — Medication 50 MILLIGRAM(S): at 09:01

## 2018-01-21 NOTE — PROVIDER CONTACT NOTE (OTHER) - SITUATION
pt refusing labs to be drawn ( cbc, BMp) . pt states he is being discharged today and his lab values have all been WNL

## 2018-01-21 NOTE — PROGRESS NOTE ADULT - ASSESSMENT
73 yo male history as noted including CAD, htn, a/w dizziness    neuro f/u noted  mri/a noted  likely BPPv  meclizine prn  outpt neuro f/u    +orthostatics  resolved with iv hydration    cards f/u  echo noted    cont home cardiac meds  monitor bp    dvt ppx  PT     dc home with outpt neuro and cards f/u this week

## 2018-01-21 NOTE — PROVIDER CONTACT NOTE (OTHER) - ACTION/TREATMENT ORDERED:
d.c may be on hold. will f/u w card. continue to monitor patient fall precautions reeduated.
ok for patient to refuse. will continue to monitor
No orders were made at this present time.
No orders were made at this present time.

## 2018-01-21 NOTE — PROGRESS NOTE ADULT - SUBJECTIVE AND OBJECTIVE BOX
BENNETT RODRIGUEZ  72y Male  MRN:[id number]    Patient is a 72y old  Male who presents with a chief complaint of Dizziness for the past 7 days (18 Jan 2018 02:55)    HPI:  NIGHT HOSPITALIST:  Patient UNKNOWN to me previously, assigned to me at this point via the ER and by Dr. 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  Patient noted some relief with meclizine given in the ER but patient declines further doses.  Patient denies HA, focal weakness.  No chest pain/pressure.  No dyspnea at present, but admits to exercise limited symptoms.  No abdominal pain, no red blood per rectum or melena.  NO back pain, no tearing back pain.  No fever, no chills, no rigors.  NO hematuria, no dysuria.  Patient admits to increased thirst and urination but reports a normal random glucose.  No weight loss or anorexia.  No sick contacts or recent travel.  Remaining review of systems not contributory. (18 Jan 2018 02:55)      Patient seen and evaluated at bedside. No acute events overnight except as noted.    Interval HPI: feeling better today.     PAST MEDICAL & SURGICAL HISTORY:  Coronary artery disease  S/P CABG x 4      REVIEW OF SYSTEMS:  as per hpi    VITALS:  Vital Signs Last 24 Hrs  T(C): 36.2 (21 Jan 2018 12:50), Max: 36.5 (21 Jan 2018 04:25)  T(F): 97.2 (21 Jan 2018 12:50), Max: 97.7 (21 Jan 2018 04:25)  HR: 67 (21 Jan 2018 12:50) (67 - 77)  BP: 149/83 (21 Jan 2018 12:50) (124/79 - 167/90)  BP(mean): --  RR: 18 (21 Jan 2018 12:50) (18 - 18)  SpO2: 96% (21 Jan 2018 12:50) (96% - 99%)     PHYSICAL EXAM:  GENERAL: NAD, well-developed  HEAD:  Atraumatic, Normocephalic  EYES: EOMI, PERRLA, conjunctiva and sclera clear  NECK: Supple, No JVD  CHEST/LUNG: Clear to auscultation bilaterally; No wheeze  HEART: S1, S2; No murmurs, rubs, or gallops  ABDOMEN: Soft, Nontender, Nondistended; Bowel sounds present  EXTREMITIES:  2+ Peripheral Pulses, No clubbing, cyanosis, or edema  PSYCH: Normal affect  NEUROLOGY: AAOX3; non-focal  SKIN: No rashes or lesions    Consultant(s) Notes Reviewed:  [x ] YES  [ ] NO  Care Discussed with Consultants/Other Providers [ x] YES  [ ] NO    MEDS:  MEDICATIONS  (STANDING):  aspirin enteric coated 81 milliGRAM(s) Oral daily  heparin  Injectable 5000 Unit(s) SubCutaneous every 8 hours  influenza   Vaccine 0.5 milliLiter(s) IntraMuscular once  meclizine 12.5 milliGRAM(s) Oral two times a day  metoprolol succinate ER 50 milliGRAM(s) Oral daily  rosuvastatin 5 milliGRAM(s) Oral at bedtime    MEDICATIONS  (PRN):  acetaminophen   Tablet. 650 milliGRAM(s) Oral every 6 hours PRN Mild Pain (1 - 3)  diazepam    Tablet 5 milliGRAM(s) Oral every 8 hours PRN BPPV  loperamide 2 milliGRAM(s) Oral every 4 hours PRN Diarrhea        ALLERGIES:  No Known Allergies      LABS:                                   14.2   6.94  )-----------( 191      ( 20 Jan 2018 08:43 )             43.0   01-20    136  |  97  |  18  ----------------------------<  84  3.8   |  23  |  1.20    Ca    9.5      20 Jan 2018 08:52          < from: MR Head w/ IV Cont (01.19.18 @ 01:10) >    IMPRESSION:      1. Microvascular ischemic change and mild age-appropriate involutional   change  2. Unremarkable MR angiographic evaluation of the cervical arteries and   [place].      < end of copied text >  < from: Transthoracic Echocardiogram (01.18.18 @ 14:44) >  -----------------  Conclusions:  1. Normal mitral valve. Mild mitral regurgitation.  2. Thickened, trileaflet aortic valve. Peak transaortic  valve gradient equals 5 mm Hg, mean transaortic valve  gradient equals 3 mm Hg, estimated aortic valve area equals  1.7 sqcm (by continuity equation), aortic valve velocity  time integral equals 24 cm, consistent with mild aortic  stenosis. Minimal aortic regurgitation.  3. Endocardium not well visualized; grossly preserved left  ventricular systolic function. Septal motion consistent  with cardiac surgery. There is mild hypokinesis if the  basal inferolateral wall.  4. Normal diastolic function  5. The right ventricle is not well visualized; grossly  normal right ventricular systolic function.  *** No previous Echo exam.  -------------------------------------------------------------    < end of copied text >

## 2018-01-22 ENCOUNTER — APPOINTMENT (OUTPATIENT)
Dept: CARDIOLOGY | Facility: CLINIC | Age: 73
End: 2018-01-22

## 2018-02-27 PROBLEM — I25.10 ATHEROSCLEROTIC HEART DISEASE OF NATIVE CORONARY ARTERY WITHOUT ANGINA PECTORIS: Chronic | Status: ACTIVE | Noted: 2018-01-17

## 2018-02-28 RX ORDER — MECLIZINE HYDROCHLORIDE 12.5 MG/1
12.5 TABLET ORAL
Qty: 60 | Refills: 0 | Status: ACTIVE | COMMUNITY
Start: 2018-01-21

## 2018-02-28 RX ORDER — DIAZEPAM 5 MG/1
5 TABLET ORAL
Qty: 15 | Refills: 0 | Status: ACTIVE | COMMUNITY
Start: 2018-01-21

## 2018-02-28 RX ORDER — AMOXICILLIN AND CLAVULANATE POTASSIUM 875; 125 MG/1; MG/1
875-125 TABLET, COATED ORAL
Qty: 10 | Refills: 0 | Status: DISCONTINUED | COMMUNITY
Start: 2017-10-19 | End: 2018-02-28

## 2018-02-28 RX ORDER — METOPROLOL TARTRATE 100 MG/1
100 TABLET, FILM COATED ORAL
Qty: 60 | Refills: 0 | Status: ACTIVE | COMMUNITY
Start: 2017-11-11

## 2018-02-28 RX ORDER — AMIKACIN SULFATE 250 MG/ML
500 INJECTION, SOLUTION INTRAMUSCULAR; INTRAVENOUS
Qty: 1 | Refills: 0 | Status: DISCONTINUED | COMMUNITY
Start: 2017-10-19 | End: 2018-02-28

## 2018-03-01 ENCOUNTER — APPOINTMENT (OUTPATIENT)
Dept: CARDIOLOGY | Facility: CLINIC | Age: 73
End: 2018-03-01

## 2018-03-05 ENCOUNTER — INPATIENT (INPATIENT)
Facility: HOSPITAL | Age: 73
LOS: 7 days | Discharge: HOME | DRG: 287 | End: 2018-03-13
Attending: INTERNAL MEDICINE | Admitting: INTERNAL MEDICINE
Payer: MEDICARE

## 2018-03-05 VITALS
OXYGEN SATURATION: 97 % | DIASTOLIC BLOOD PRESSURE: 110 MMHG | TEMPERATURE: 99 F | WEIGHT: 160.06 LBS | HEIGHT: 68 IN | RESPIRATION RATE: 18 BRPM | SYSTOLIC BLOOD PRESSURE: 199 MMHG | HEART RATE: 79 BPM

## 2018-03-05 DIAGNOSIS — Z95.1 PRESENCE OF AORTOCORONARY BYPASS GRAFT: Chronic | ICD-10-CM

## 2018-03-05 DIAGNOSIS — R42 DIZZINESS AND GIDDINESS: ICD-10-CM

## 2018-03-05 LAB
ALBUMIN SERPL ELPH-MCNC: 4.9 G/DL — SIGNIFICANT CHANGE UP (ref 3.3–5)
ALP SERPL-CCNC: 80 U/L — SIGNIFICANT CHANGE UP (ref 40–120)
ALT FLD-CCNC: 16 U/L RC — SIGNIFICANT CHANGE UP (ref 10–45)
ANION GAP SERPL CALC-SCNC: 14 MMOL/L — SIGNIFICANT CHANGE UP (ref 5–17)
APPEARANCE UR: CLEAR — SIGNIFICANT CHANGE UP
AST SERPL-CCNC: 24 U/L — SIGNIFICANT CHANGE UP (ref 10–40)
BASE EXCESS BLDV CALC-SCNC: 3.8 MMOL/L — HIGH (ref -2–2)
BASOPHILS # BLD AUTO: 0.1 K/UL — SIGNIFICANT CHANGE UP (ref 0–0.2)
BASOPHILS NFR BLD AUTO: 1.2 % — SIGNIFICANT CHANGE UP (ref 0–2)
BILIRUB SERPL-MCNC: 0.8 MG/DL — SIGNIFICANT CHANGE UP (ref 0.2–1.2)
BILIRUB UR-MCNC: NEGATIVE — SIGNIFICANT CHANGE UP
BUN SERPL-MCNC: 20 MG/DL — SIGNIFICANT CHANGE UP (ref 7–23)
CA-I SERPL-SCNC: 1.27 MMOL/L — SIGNIFICANT CHANGE UP (ref 1.12–1.3)
CALCIUM SERPL-MCNC: 9.9 MG/DL — SIGNIFICANT CHANGE UP (ref 8.4–10.5)
CHLORIDE BLDV-SCNC: 104 MMOL/L — SIGNIFICANT CHANGE UP (ref 96–108)
CHLORIDE SERPL-SCNC: 100 MMOL/L — SIGNIFICANT CHANGE UP (ref 96–108)
CK MB BLD-MCNC: 1 % — SIGNIFICANT CHANGE UP (ref 0–3.5)
CK MB CFR SERPL CALC: 1.1 NG/ML — SIGNIFICANT CHANGE UP (ref 0–6.7)
CK SERPL-CCNC: 108 U/L — SIGNIFICANT CHANGE UP (ref 30–200)
CO2 BLDV-SCNC: 32 MMOL/L — HIGH (ref 22–30)
CO2 SERPL-SCNC: 28 MMOL/L — SIGNIFICANT CHANGE UP (ref 22–31)
COLOR SPEC: COLORLESS — SIGNIFICANT CHANGE UP
CREAT SERPL-MCNC: 1.29 MG/DL — SIGNIFICANT CHANGE UP (ref 0.5–1.3)
DIFF PNL FLD: NEGATIVE — SIGNIFICANT CHANGE UP
EOSINOPHIL # BLD AUTO: 0.1 K/UL — SIGNIFICANT CHANGE UP (ref 0–0.5)
EOSINOPHIL NFR BLD AUTO: 1.1 % — SIGNIFICANT CHANGE UP (ref 0–6)
EPI CELLS # UR: SIGNIFICANT CHANGE UP /HPF
GAS PNL BLDV: 142 MMOL/L — SIGNIFICANT CHANGE UP (ref 136–145)
GAS PNL BLDV: SIGNIFICANT CHANGE UP
GLUCOSE BLDV-MCNC: 110 MG/DL — HIGH (ref 70–99)
GLUCOSE SERPL-MCNC: 109 MG/DL — HIGH (ref 70–99)
GLUCOSE UR QL: NEGATIVE — SIGNIFICANT CHANGE UP
HCO3 BLDV-SCNC: 30 MMOL/L — HIGH (ref 21–29)
HCT VFR BLD CALC: 46.6 % — SIGNIFICANT CHANGE UP (ref 39–50)
HCT VFR BLDA CALC: 44 % — SIGNIFICANT CHANGE UP (ref 39–50)
HGB BLD CALC-MCNC: 14.4 G/DL — SIGNIFICANT CHANGE UP (ref 13–17)
HGB BLD-MCNC: 15.3 G/DL — SIGNIFICANT CHANGE UP (ref 13–17)
KETONES UR-MCNC: NEGATIVE — SIGNIFICANT CHANGE UP
LACTATE BLDV-MCNC: 1.5 MMOL/L — SIGNIFICANT CHANGE UP (ref 0.7–2)
LEUKOCYTE ESTERASE UR-ACNC: NEGATIVE — SIGNIFICANT CHANGE UP
LYMPHOCYTES # BLD AUTO: 1.8 K/UL — SIGNIFICANT CHANGE UP (ref 1–3.3)
LYMPHOCYTES # BLD AUTO: 32.1 % — SIGNIFICANT CHANGE UP (ref 13–44)
MCHC RBC-ENTMCNC: 31.6 PG — SIGNIFICANT CHANGE UP (ref 27–34)
MCHC RBC-ENTMCNC: 33 GM/DL — SIGNIFICANT CHANGE UP (ref 32–36)
MCV RBC AUTO: 95.9 FL — SIGNIFICANT CHANGE UP (ref 80–100)
MONOCYTES # BLD AUTO: 0.3 K/UL — SIGNIFICANT CHANGE UP (ref 0–0.9)
MONOCYTES NFR BLD AUTO: 5.2 % — SIGNIFICANT CHANGE UP (ref 2–14)
NEUTROPHILS # BLD AUTO: 3.4 K/UL — SIGNIFICANT CHANGE UP (ref 1.8–7.4)
NEUTROPHILS NFR BLD AUTO: 60.5 % — SIGNIFICANT CHANGE UP (ref 43–77)
NITRITE UR-MCNC: NEGATIVE — SIGNIFICANT CHANGE UP
NT-PROBNP SERPL-SCNC: 197 PG/ML — SIGNIFICANT CHANGE UP (ref 0–300)
OTHER CELLS CSF MANUAL: 11 ML/DL — LOW (ref 18–22)
PCO2 BLDV: 56 MMHG — HIGH (ref 35–50)
PH BLDV: 7.35 — SIGNIFICANT CHANGE UP (ref 7.35–7.45)
PH UR: 8 — SIGNIFICANT CHANGE UP (ref 5–8)
PLATELET # BLD AUTO: 200 K/UL — SIGNIFICANT CHANGE UP (ref 150–400)
PO2 BLDV: 30 MMHG — SIGNIFICANT CHANGE UP (ref 25–45)
POTASSIUM BLDV-SCNC: 4 MMOL/L — SIGNIFICANT CHANGE UP (ref 3.5–5)
POTASSIUM SERPL-MCNC: 4.1 MMOL/L — SIGNIFICANT CHANGE UP (ref 3.5–5.3)
POTASSIUM SERPL-SCNC: 4.1 MMOL/L — SIGNIFICANT CHANGE UP (ref 3.5–5.3)
PROT SERPL-MCNC: 8.6 G/DL — HIGH (ref 6–8.3)
PROT UR-MCNC: NEGATIVE — SIGNIFICANT CHANGE UP
RBC # BLD: 4.85 M/UL — SIGNIFICANT CHANGE UP (ref 4.2–5.8)
RBC # FLD: 12 % — SIGNIFICANT CHANGE UP (ref 10.3–14.5)
SAO2 % BLDV: 56 % — LOW (ref 67–88)
SODIUM SERPL-SCNC: 142 MMOL/L — SIGNIFICANT CHANGE UP (ref 135–145)
SP GR SPEC: 1.01 — SIGNIFICANT CHANGE UP (ref 1.01–1.02)
TROPONIN T SERPL-MCNC: <0.01 NG/ML — SIGNIFICANT CHANGE UP (ref 0–0.06)
TSH SERPL-MCNC: 1.26 UIU/ML — SIGNIFICANT CHANGE UP (ref 0.27–4.2)
UROBILINOGEN FLD QL: NEGATIVE — SIGNIFICANT CHANGE UP
WBC # BLD: 5.7 K/UL — SIGNIFICANT CHANGE UP (ref 3.8–10.5)
WBC # FLD AUTO: 5.7 K/UL — SIGNIFICANT CHANGE UP (ref 3.8–10.5)
WBC UR QL: SIGNIFICANT CHANGE UP /HPF (ref 0–5)

## 2018-03-05 PROCEDURE — 70450 CT HEAD/BRAIN W/O DYE: CPT | Mod: 26

## 2018-03-05 PROCEDURE — 93010 ELECTROCARDIOGRAM REPORT: CPT

## 2018-03-05 PROCEDURE — 99285 EMERGENCY DEPT VISIT HI MDM: CPT | Mod: 25,GC

## 2018-03-05 PROCEDURE — 71046 X-RAY EXAM CHEST 2 VIEWS: CPT | Mod: 26

## 2018-03-05 PROCEDURE — 99223 1ST HOSP IP/OBS HIGH 75: CPT

## 2018-03-05 RX ORDER — MECLIZINE HCL 12.5 MG
12.5 TABLET ORAL
Qty: 0 | Refills: 0 | Status: DISCONTINUED | OUTPATIENT
Start: 2018-03-05 | End: 2018-03-13

## 2018-03-05 RX ORDER — MECLIZINE HCL 12.5 MG
1 TABLET ORAL
Qty: 60 | Refills: 0 | OUTPATIENT

## 2018-03-05 RX ORDER — METOPROLOL TARTRATE 50 MG
75 TABLET ORAL
Qty: 0 | Refills: 0 | COMMUNITY

## 2018-03-05 RX ORDER — HEPARIN SODIUM 5000 [USP'U]/ML
5000 INJECTION INTRAVENOUS; SUBCUTANEOUS EVERY 8 HOURS
Qty: 0 | Refills: 0 | Status: DISCONTINUED | OUTPATIENT
Start: 2018-03-05 | End: 2018-03-13

## 2018-03-05 RX ORDER — SODIUM CHLORIDE 9 MG/ML
1000 INJECTION INTRAMUSCULAR; INTRAVENOUS; SUBCUTANEOUS
Qty: 0 | Refills: 0 | Status: DISCONTINUED | OUTPATIENT
Start: 2018-03-05 | End: 2018-03-07

## 2018-03-05 RX ORDER — ASPIRIN/CALCIUM CARB/MAGNESIUM 324 MG
81 TABLET ORAL DAILY
Qty: 0 | Refills: 0 | Status: DISCONTINUED | OUTPATIENT
Start: 2018-03-05 | End: 2018-03-13

## 2018-03-05 RX ORDER — ASPIRIN/CALCIUM CARB/MAGNESIUM 324 MG
1 TABLET ORAL
Qty: 0 | Refills: 0 | COMMUNITY

## 2018-03-05 RX ORDER — SODIUM CHLORIDE 9 MG/ML
3 INJECTION INTRAMUSCULAR; INTRAVENOUS; SUBCUTANEOUS ONCE
Qty: 0 | Refills: 0 | Status: COMPLETED | OUTPATIENT
Start: 2018-03-05 | End: 2018-03-05

## 2018-03-05 RX ORDER — METOPROLOL TARTRATE 50 MG
100 TABLET ORAL DAILY
Qty: 0 | Refills: 0 | Status: DISCONTINUED | OUTPATIENT
Start: 2018-03-05 | End: 2018-03-07

## 2018-03-05 RX ORDER — ROSUVASTATIN CALCIUM 5 MG/1
1 TABLET ORAL
Qty: 0 | Refills: 0 | COMMUNITY

## 2018-03-05 RX ORDER — ATORVASTATIN CALCIUM 80 MG/1
20 TABLET, FILM COATED ORAL AT BEDTIME
Qty: 0 | Refills: 0 | Status: DISCONTINUED | OUTPATIENT
Start: 2018-03-05 | End: 2018-03-11

## 2018-03-05 RX ADMIN — SODIUM CHLORIDE 75 MILLILITER(S): 9 INJECTION INTRAMUSCULAR; INTRAVENOUS; SUBCUTANEOUS at 10:49

## 2018-03-05 RX ADMIN — SODIUM CHLORIDE 3 MILLILITER(S): 9 INJECTION INTRAMUSCULAR; INTRAVENOUS; SUBCUTANEOUS at 10:44

## 2018-03-05 NOTE — ED ADULT NURSE NOTE - OBJECTIVE STATEMENT
71 yo male presents to the ED from home c/o dizziness, blurry vision x2 days and HTN today. patient states he has been having these symptoms x1 month but has increasingly gotten worse over the last 2 days. patient also c/o HA, SOB on exertion. patient is AAOx3. PERRL. speech is clear. gait is steady. upper and lower extremities equal in strength and sensation bilaterally. BS= 103. BP= 180s-190s systolically. patient denies chest pain, fevers, chills, N/V/D, cough, abdominal pain, dysuria. patient states he has been having increased urination x 3 months. NSR on monitor. MD at the bedside. 71 yo male with PMH triple bypass presents to the ED from home c/o dizziness, blurry vision x2 days and HTN today. patient states he has been having these symptoms x1 month but has increasingly gotten worse over the last 2 days. patient also c/o HA, SOB on exertion. patient is AAOx3. PERRL. speech is clear. gait is steady. upper and lower extremities equal in strength and sensation bilaterally. BS= 103. BP= 180s-190s systolically. abdomen is soft, non-tender, non-distended. patient denies chest pain, fevers, chills, N/V/D, cough, abdominal pain, dysuria. patient states he has been having increased urination x 3 months. NSR on monitor. MD at the bedside.

## 2018-03-05 NOTE — CONSULT NOTE ADULT - ASSESSMENT
72 year-old with known CAD status post remote CABG, recently admitted with BPPV that responded to outpatient Epley maneuver as outpatient (neurology office), now readmitted with persistent dizziness and symptoms that make him concerned for hypoglycemia.  Symptoms predate starting Valium or meclizine.    Consider endocrine evaluation if patient seen to have hypoglycemia.    Will check cardiac MRI to evaluate for amyloid.  Will consider fat pad biopsy as well.

## 2018-03-05 NOTE — PROVIDER CONTACT NOTE (OTHER) - BACKGROUND
Pt admitted with dizziness for past 2 months. Hx of HTN and s/p CABG x 4. 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 Pt last took Lopressor at 0900 today.

## 2018-03-05 NOTE — H&P ADULT - ASSESSMENT
71 yo male h/o cad s/p cabg, htn, a/w lightheadedness, presyncope 71 yo male h/o cad s/p cabg, htn, vertigo a/w lightheadedness, presyncope    cards eval  neuro eval  check orthostatics  tele monitor  cardiac mri  consider stress test  cont home meds  dvt ppx

## 2018-03-05 NOTE — ED PROVIDER NOTE - OBJECTIVE STATEMENT
71yo m pmh CAD s/p CABG, HLD, p/w lightheadedness. Pt states intermittent lightheadedness for past few months, was seen at this hospital two months ago and told had BPPV but pt explains that this lightheadedness is different from the dizziness at that time. Pt denies chest pain and palpitations. Pt feels symptoms are 2/2 hypoglycemia because they seem to improve with meals however pt has not checked his FS during episodes. No fevers, chills, chest pain, SOB, abdominal pain, urinary symptoms.

## 2018-03-05 NOTE — H&P ADULT - NSHPPHYSICALEXAM_GEN_ALL_CORE
Vital Signs Last 24 Hrs  T(C): 36.9 (05 Mar 2018 15:23), Max: 37.4 (05 Mar 2018 10:13)  T(F): 98.4 (05 Mar 2018 15:23), Max: 99.4 (05 Mar 2018 10:13)  HR: 66 (05 Mar 2018 15:23) (66 - 83)  BP: 167/78 (05 Mar 2018 15:23) (157/96 - 199/110)  BP(mean): --  RR: 16 (05 Mar 2018 15:23) (16 - 19)  SpO2: 98% (05 Mar 2018 15:23) (97% - 99%)    PHYSICAL EXAM:  GENERAL: NAD, well-developed  HEAD:  Atraumatic, Normocephalic  EYES: EOMI, PERRLA, conjunctiva and sclera clear  NECK: Supple, No JVD  CHEST/LUNG: Clear to auscultation bilaterally; No wheeze  HEART: Regular rate and rhythm; No murmurs, rubs, or gallops  ABDOMEN: Soft, Nontender, Nondistended; Bowel sounds present  EXTREMITIES:  2+ Peripheral Pulses, No clubbing, cyanosis, or edema  PSYCH: AAOx3  NEUROLOGY: non-focal  SKIN: No rashes or lesions

## 2018-03-05 NOTE — ED ADULT NURSE REASSESSMENT NOTE - NS ED NURSE REASSESS COMMENT FT1
patient resting comfortably in bed in no acute distress. patient denies pain at this time. waiting for bed. NSR on monitor.

## 2018-03-05 NOTE — ED ADULT NURSE REASSESSMENT NOTE - NS ED NURSE REASSESS COMMENT FT1
patient resting comfortably in bed in no acute distress. patient denies pain at this time. VSS.  NSR on monitor. waiting for a bed. patient is aware. safety measures provided and maintained.

## 2018-03-05 NOTE — PROVIDER CONTACT NOTE (OTHER) - ASSESSMENT
Pt is alert and oriented. Vital signs otherwise stable. Pt asymptomatic, without complaint. Pt stated his baseline SBP is in the 150s. Pt does not want intervention.

## 2018-03-05 NOTE — CONSULT NOTE ADULT - SUBJECTIVE AND OBJECTIVE BOX
Patient seen and evaluated @ 8pm on 4 [facility]  Chief Complaint: Dizziness    HPI:  71yo m pmh CAD s/p CABG, HLD, p/w lightheadedness. Pt states intermittent lightheadedness for past few months, was seen at this hospital two months ago and told had BPPV but pt explains that this lightheadedness is different from the dizziness at that time. Pt denies chest pain and palpitations. Pt feels symptoms are 2/2 hypoglycemia because they seem to improve with meals however pt has not checked his FS during episodes. No fevers, chills, chest pain, SOB, abdominal pain, urinary symptoms. (05 Mar 2018 15:36)    PMH:   Vertigo  Coronary artery disease    PSH:   S/P CABG x 4    Medications:   aspirin enteric coated 81 milliGRAM(s) Oral daily  atorvastatin 20 milliGRAM(s) Oral at bedtime  heparin  Injectable 5000 Unit(s) SubCutaneous every 8 hours  meclizine 12.5 milliGRAM(s) Oral two times a day PRN  metoprolol     tartrate 100 milliGRAM(s) Oral daily  sodium chloride 0.9%. 1000 milliLiter(s) IV Continuous <Continuous>    Allergies:  No Known Allergies    FAMILY HISTORY:  Family history of hypertension (Sibling)    Social History: , works as cardiologist  Smoking: nonsmoker  Alcohol: no EtOH  Drugs: no illicit drug use    Review of Systems:   Constitutional: No fever, chills, fatigue, or changes in weight  HEENT: No blurry vision, eye pain, headache, runny nose, or sore throat  Respiratory: No shortness of breath, cough, or wheezing  Cardiovascular: No chest pain or palpitations  Gastrointestinal: No nausea, vomiting, diarrhea, or abdominal pain  Genitourinary: No dysuria or incontinence  Extremities: No lower extremity swelling  Neurologic: No focal findings  Lymphatic: No lymphadenopathy   Skin: No rash  Psychiatry: No anxiety or depression  10 point review of systems is otherwise negative except as mentioned above            Physical Exam:  T(C): 36.9 (03-05-18 @ 19:40), Max: 37.4 (03-05-18 @ 10:13)  HR: 67 (03-05-18 @ 19:40) (66 - 83)  BP: 173/89 (03-05-18 @ 19:40) (157/96 - 199/110)  RR: 16 (03-05-18 @ 19:40) (16 - 19)  SpO2: 97% (03-05-18 @ 19:40) (97% - 99%)  Wt(kg): --    Daily Height in cm: 172.72 (05 Mar 2018 10:13)    Daily     Appearance: Normal, well groomed, NAD  Eyes: PERRLA, EOMI, pink conjunctiva, no scleral icterus   HENT: Normal oral mucosa  Cardiovascular: RRR, S1, S2, no murmur, rub, or gallop; no edema; no JVD  Respiratory: Clear to auscultation bilaterally  Gastrointestinal: Soft, non-tender, non-distended, BS+  Musculoskeletal: No clubbing or joint deformity   Neurologic: No focal weakness  Lymphatic: No lymphadenopathy  Psychiatry: AAOx3 with appropriate mood and affect  Skin: No rashes, ecchymoses, or cyanosis    Cardiovascular Diagnostic Testing:    ECG: NSR with borderline low voltage in limb leads    Echo: < from: Transthoracic Echocardiogram (01.18.18 @ 14:44) >  ------------------------------------------------------------------------  Conclusions:  1. Normal mitral valve. Mild mitral regurgitation.  2. Thickened, trileaflet aortic valve. Peak transaortic  valve gradient equals 5 mm Hg, mean transaortic valve  gradient equals 3 mm Hg, estimated aortic valve area equals  1.7 sqcm (by continuity equation), aortic valve velocity  time integral equals 24 cm, consistent with mild aortic  stenosis. Minimal aortic regurgitation.  3. Endocardium not well visualized; grossly preserved left  ventricular systolic function. Septal motion consistent  with cardiac surgery. There is mild hypokinesis if the  basal inferolateral wall.  4. Normal diastolic function  5. The right ventricle is not well visualized; grossly  normal right ventricular systolic function.  *** No previous Echo exam.  ------------------------------------------------------------------------  Confirmed on  1/19/2018 - 13:39:26 by Kenan Glover M.D.  ------------------------------------------------------------------------    < end of copied text >    Interpretation of Telemetry: NSR    Labs:                        15.3   5.7   )-----------( 200      ( 05 Mar 2018 10:44 )             46.6     03-05    142  |  100  |  20  ----------------------------<  109<H>  4.1   |  28  |  1.29    Ca    9.9      05 Mar 2018 10:44    TPro  8.6<H>  /  Alb  4.9  /  TBili  0.8  /  DBili  x   /  AST  24  /  ALT  16  /  AlkPhos  80  03-05      CARDIAC MARKERS ( 05 Mar 2018 10:44 )  x     / <0.01 ng/mL / 108 U/L / x     / 1.1 ng/mL      Serum Pro-Brain Natriuretic Peptide: 197 pg/mL (03-05 @ 10:44)        Thyroid Stimulating Hormone, Serum: 1.26 uIU/mL (03-05 @ 13:14)

## 2018-03-05 NOTE — ED PROVIDER NOTE - MEDICAL DECISION MAKING DETAILS
pt with lightheadedness, concern for near syncope, ekg wnl, will check cardiacs + admit for tele monitoring. also concern for htn urgency, will check CT head, renal function, ekg; BP systolic <200, will differ additional BP meds to primary team.

## 2018-03-05 NOTE — ED PROVIDER NOTE - ATTENDING CONTRIBUTION TO CARE
****ATTENDING**** 71yo male hx cad, s/p cabg presents with dizziness, weakness and blurry vision. States he has been having intermittent symptoms for 1 mo or plus. States symptoms occur at the same time when he has blurry vision w feeling that he may pass out, symptoms improve w eating so he's not sure if he's hypoglycemic. States he eats regularly with a full diet. Pt had dizziness few mos ago seen at [facility] had MRI/MRA diagnosed w peripheral vertigo. States these symptoms are different. + mild HA, no chest pain, palp, sob. + fatigue w exertion. No new meds.  On exam, Patient is awake,alert,oriented x 3. Patient is well appearing and in no acute distress. Patient's chest is clear to ausculation, +s1s2. Abdomen is soft nd/nt +BS. Extremity with no swelling or calf tenderness. PERRL, EOMI no nystagums, CN3-12 intact 5/5 UE/LE nml sensation and coordination.  EKG reviewed.  Pt noted to be hypertensive, states he is nervous to be here.   Check labs, including CE eval for ACS, arrhythmia, electrolytes.

## 2018-03-05 NOTE — H&P ADULT - HISTORY OF PRESENT ILLNESS
73yo m pmh CAD s/p CABG, HLD, p/w lightheadedness. Pt states intermittent lightheadedness for past few months, was seen at this hospital two months ago and told had BPPV but pt explains that this lightheadedness is different from the dizziness at that time. Pt denies chest pain and palpitations. Pt feels symptoms are 2/2 hypoglycemia because they seem to improve with meals however pt has not checked his FS during episodes. No fevers, chills, chest pain, SOB, abdominal pain, urinary symptoms.

## 2018-03-05 NOTE — ED ADULT NURSE REASSESSMENT NOTE - NS ED NURSE REASSESS COMMENT FT1
patient resting comfortably in bed in no acute distress. patient denies pain at this time. patient NSR on monitor. pending chest xray.

## 2018-03-06 LAB
ANION GAP SERPL CALC-SCNC: 13 MMOL/L — SIGNIFICANT CHANGE UP (ref 5–17)
BUN SERPL-MCNC: 19 MG/DL — SIGNIFICANT CHANGE UP (ref 7–23)
CALCIUM SERPL-MCNC: 9.3 MG/DL — SIGNIFICANT CHANGE UP (ref 8.4–10.5)
CHLORIDE SERPL-SCNC: 102 MMOL/L — SIGNIFICANT CHANGE UP (ref 96–108)
CK MB CFR SERPL CALC: 1 NG/ML — SIGNIFICANT CHANGE UP (ref 0–6.7)
CK SERPL-CCNC: 87 U/L — SIGNIFICANT CHANGE UP (ref 30–200)
CO2 SERPL-SCNC: 25 MMOL/L — SIGNIFICANT CHANGE UP (ref 22–31)
CREAT SERPL-MCNC: 1.36 MG/DL — HIGH (ref 0.5–1.3)
GLUCOSE BLDC GLUCOMTR-MCNC: 122 MG/DL — HIGH (ref 70–99)
GLUCOSE BLDC GLUCOMTR-MCNC: 90 MG/DL — SIGNIFICANT CHANGE UP (ref 70–99)
GLUCOSE BLDC GLUCOMTR-MCNC: 96 MG/DL — SIGNIFICANT CHANGE UP (ref 70–99)
GLUCOSE SERPL-MCNC: 88 MG/DL — SIGNIFICANT CHANGE UP (ref 70–99)
HCT VFR BLD CALC: 41.8 % — SIGNIFICANT CHANGE UP (ref 39–50)
HGB BLD-MCNC: 13.9 G/DL — SIGNIFICANT CHANGE UP (ref 13–17)
MCHC RBC-ENTMCNC: 31.9 PG — SIGNIFICANT CHANGE UP (ref 27–34)
MCHC RBC-ENTMCNC: 33.3 GM/DL — SIGNIFICANT CHANGE UP (ref 32–36)
MCV RBC AUTO: 95.7 FL — SIGNIFICANT CHANGE UP (ref 80–100)
PLATELET # BLD AUTO: 183 K/UL — SIGNIFICANT CHANGE UP (ref 150–400)
POTASSIUM SERPL-MCNC: 4.3 MMOL/L — SIGNIFICANT CHANGE UP (ref 3.5–5.3)
POTASSIUM SERPL-SCNC: 4.3 MMOL/L — SIGNIFICANT CHANGE UP (ref 3.5–5.3)
RBC # BLD: 4.36 M/UL — SIGNIFICANT CHANGE UP (ref 4.2–5.8)
RBC # FLD: 12 % — SIGNIFICANT CHANGE UP (ref 10.3–14.5)
SODIUM SERPL-SCNC: 140 MMOL/L — SIGNIFICANT CHANGE UP (ref 135–145)
TROPONIN T SERPL-MCNC: <0.01 NG/ML — SIGNIFICANT CHANGE UP (ref 0–0.06)
WBC # BLD: 6.4 K/UL — SIGNIFICANT CHANGE UP (ref 3.8–10.5)
WBC # FLD AUTO: 6.4 K/UL — SIGNIFICANT CHANGE UP (ref 3.8–10.5)

## 2018-03-06 PROCEDURE — 99233 SBSQ HOSP IP/OBS HIGH 50: CPT

## 2018-03-06 PROCEDURE — 75561 CARDIAC MRI FOR MORPH W/DYE: CPT | Mod: 26

## 2018-03-06 RX ORDER — ZALEPLON 10 MG
5 CAPSULE ORAL ONCE
Qty: 0 | Refills: 0 | Status: DISCONTINUED | OUTPATIENT
Start: 2018-03-06 | End: 2018-03-06

## 2018-03-06 RX ADMIN — Medication 81 MILLIGRAM(S): at 11:29

## 2018-03-06 RX ADMIN — Medication 5 MILLIGRAM(S): at 23:36

## 2018-03-06 RX ADMIN — Medication 100 MILLIGRAM(S): at 08:33

## 2018-03-06 RX ADMIN — SODIUM CHLORIDE 75 MILLILITER(S): 9 INJECTION INTRAMUSCULAR; INTRAVENOUS; SUBCUTANEOUS at 23:16

## 2018-03-06 NOTE — PROVIDER CONTACT NOTE (OTHER) - BACKGROUND
Pt admitted on 3/5 with c/o dizziness for past two months. Pt has history of HTN and is s/p CABG x 4. Pt is a cardiologist.

## 2018-03-06 NOTE — CONSULT NOTE ADULT - SUBJECTIVE AND OBJECTIVE BOX
HPI:  72 year old gentleman past medical history of CAD s/p CABG, HLD, admitted with episodic lightheadedness since ~ January. Pt does have a history of vertiginous dizziness which is distinct from his lightheadedness. Vertigo was positional and hospitalized 2 months ago for this. MRI Brain, MRA head and neck were unremarkable and symptoms responded to outpatient epley, vestibular PT. Pt followed by my associate Dr. Dunn. Pt reports lightheadedness is not postural or positional. Seems to worsen when feels 'hypoglycemic' but reports blood glucose has been running in good range. Does have IBS and intermittent diarrhea worse in hospital but reports generally remains well hydrated and does not feel lightheadedness is related to his fluid status. No headaches or vision changes, no focal facial or limb weakness, paresthesias. No hx of neuropathy.    REVIEW OF SYSTEMS: as per chart	    Allergies  No Known Allergies  Intolerances    Medications:  aspirin enteric coated 81 milliGRAM(s) Oral daily  atorvastatin 20 milliGRAM(s) Oral at bedtime  heparin  Injectable 5000 Unit(s) SubCutaneous every 8 hours  meclizine 12.5 milliGRAM(s) Oral two times a day PRN  metoprolol     tartrate 100 milliGRAM(s) Oral daily  sodium chloride 0.9%. 1000 milliLiter(s) IV Continuous <Continuous>    PAST MEDICAL & SURGICAL HISTORY:  Vertigo  Coronary artery disease  S/P CABG x 4    Social: No toxic habits  FAMILY HISTORY:  Family history of hypertension (Sibling)    Advanced care planning reviewed and in chart        Vitals:  Vital Signs Last 24 Hrs  T(C): 36.9 (06 Mar 2018 05:32), Max: 36.9 (05 Mar 2018 15:23)  T(F): 98.4 (06 Mar 2018 05:32), Max: 98.4 (05 Mar 2018 15:23)  HR: 66 (06 Mar 2018 08:35) (65 - 77)  BP: 180/90 (06 Mar 2018 08:35) (157/96 - 180/90)  BP(mean): --  RR: 17 (06 Mar 2018 05:32) (16 - 17)  SpO2: 96% (06 Mar 2018 05:32) (95% - 99%)    NEUROLOGICAL EXAM:    Mental status: Awake, alert, and in no apparent distress. Oriented x 3. Language function is normal. Recent memory, digit span and concentration were normal.     Cranial Nerves: Pupils were equal, round, reactive to light. Extraocular movements were intact without nystagmus. Visual field were full. Fundoscopic exam was deferred. Facial sensation was intact to light touch. There was no facial asymmetry. The palate was upgoing symmetrically and tongue was midline. Hearing acuity was intact to finger rub AU. Shoulder shrug was full bilaterally    Motor exam: Bulk and tone were normal. Strength was 5/5 in all four extremities. Fine finger movements were symmetric and normal.    Reflexes: 1 in the bilateral upper extremities. 1 in the bilateral lower extremities. Toes were downgoing bilaterally.     Sensation: Intact to light touch, temperature.     Coordination: Finger-nose-finger and RAHM without dysmetria.     Gait: deferred    Labs:  CBC Full  -  ( 06 Mar 2018 06:02 )  WBC Count : 6.4 K/uL  Hemoglobin : 13.9 g/dL  Hematocrit : 41.8 %  Platelet Count - Automated : 183 K/uL  Mean Cell Volume : 95.7 fl  Mean Cell Hemoglobin : 31.9 pg  Mean Cell Hemoglobin Concentration : 33.3 gm/dL  Auto Neutrophil # : x  Auto Lymphocyte # : x  Auto Monocyte # : x  Auto Eosinophil # : x  Auto Basophil # : x  Auto Neutrophil % : x  Auto Lymphocyte % : x  Auto Monocyte % : x  Auto Eosinophil % : x  Auto Basophil % : x    03-06    140  |  102  |  19  ----------------------------<  88  4.3   |  25  |  1.36<H>  Ca    9.3      06 Mar 2018 06:04  TPro  8.6<H>  /  Alb  4.9  /  TBili  0.8  /  DBili  x   /  AST  24  /  ALT  16  /  AlkPhos  80  03-05    LIVER FUNCTIONS - ( 05 Mar 2018 10:44 )  Alb: 4.9 g/dL / Pro: 8.6 g/dL / ALK PHOS: 80 U/L / ALT: 16 U/L RC / AST: 24 U/L / GGT: x             Urinalysis Basic - ( 05 Mar 2018 11:29 )    Color: Colorless / Appearance: Clear / S.012 / pH: x  Gluc: x / Ketone: Negative  / Bili: Negative / Urobili: Negative   Blood: x / Protein: Negative / Nitrite: Negative   Leuk Esterase: Negative / RBC: x / WBC 0-2 /HPF   Sq Epi: x / Non Sq Epi: OCC /HPF / Bacteria: x    < from: MR Head w/ IV Cont (18 @ 01:10) >  EXAM:  MR ANGIO BRAIN                          EXAM:  MR ANGIO NECK IC                          EXAM:  MR BRAIN IC                          PROCEDURE DATE:  2018        INTERPRETATION:  HISTORY: Dizziness evaluate for posterior fossa CVA    MRI OF THE BRAIN, MRA OF THE [place] OF [place], AND MRA OF THE CERVICAL   ARTERIES:    TECHNIQUE:     The examination consists of:  1) Axial SPGR   2) Axial FSE T2-weighted   3) Axial FLAIR  4) Sagittal T1 FLAIR  5) Axial GRE and SWI  6) Axial diffusion-weighted   7) 3D time-of-flight MRA of the [place] of [place]  8) 2D time-of-flight MRA of the cervical arteries   9) 3D MRA of the carotid artery bifurcations  10) Coronal contrast-enhanced MRA of cervical arteries and [place] (10cc Gadavist, 0 cc discarded)  11) Axial T1-weighted post-contrast    There are no comparison MRIs.      FINDINGS:    There is no evidence of recent infarction on the diffusion-weighted   sequence.  Microvascular ischemic changes involving the periventricular and   subcortical white matter. Mild age-appropriate involutional change.  Evaluation of the posterior fossa is unremarkable.  The sella turcica   region is unremarkable.  Supratentorially, the appearance of the brain   parenchyma and ventricular system is unremarkable.     The MR angiographic evaluation of the [place] demonstrates the   internal carotid arteries to be patent.  The anterior, middle, and   posterior cerebral arteries are unremarkable.  The vertebrobasilar   confluence is unremarkable.  The basilar artery is patent.      The MR angiographic evaluation of the cervical arteries demonstrates the   internal carotid arteries and vertebral arteries to be patent.  Left   vertebral is dominant..      IMPRESSION:      1. Microvascular ischemic change and mild age-appropriate involutional   change  2. Unremarkable MR angiographic evaluation of the cervical arteries and   [place].    LARISSA MARTINEZ M.D., ATTENDING RADIOLOGIST  This document has been electronically signed. 2018  8:58AM

## 2018-03-06 NOTE — PROVIDER CONTACT NOTE (OTHER) - ASSESSMENT
Pt axox3, vs, SBP High, NP aware, pt refusing added meds for BP and heparin sub q.,No bleeding noted. .NO sob or vomiting noted.

## 2018-03-06 NOTE — PROVIDER CONTACT NOTE (OTHER) - ASSESSMENT
Pt is alert and oriented x 4. Pt stated that "there is no need" to recheck cardiac enzymes, as he has had dizziness for past 2 months, has no chest pain, etc.

## 2018-03-06 NOTE — PROGRESS NOTE ADULT - ASSESSMENT
72 year-old with known CAD status post remote CABG, recently admitted with BPPV that responded to outpatient Epley maneuver as outpatient (neurology office), now readmitted with persistent dizziness and symptoms that make him concerned for hypoglycemia.  Symptoms predate starting Valium or meclizine.    Consider endocrine evaluation if patient seen to have hypoglycemia.    Will review cardiac MRI, although it does not appear to offer etiology of symptom. Will consider fat pad biopsy as well.    In light of hyperdynamic LV on MRI, will pursue stress echo to evaluate for dynamic LVOT gradient as etiology of dizziness and decrease in exertional capacity.

## 2018-03-06 NOTE — CONSULT NOTE ADULT - ASSESSMENT
A/P: 72 year old gentleman past medical history of CAD s/p CABG, HLD, admitted with episodic lightheadedness since ~ January. Pt does have a history of vertiginous dizziness which is distinct from his lightheadedness. Vertigo was positional and hospitalized 2 months ago for this. MRI Brain, MRA head and neck were unremarkable and symptoms responded to outpatient epley, vestibular PT. Pt followed by my associate Dr. Dunn. Pt reports lightheadedness is not postural or positional. Seems to worsen when feels 'hypoglycemic' but reports blood glucose has been running in good range. Does have IBS and intermittent diarrhea worse in hospital but reports generally remains well hydrated and does not feel lightheadedness is related to his fluid status. No headaches or vision changes, no focal facial or limb weakness, paresthesias. No hx of neuropathy.    Exam nonfocal    Symptoms not typical for his prior BPV and recent imaging MRI brain, MRA head and neck in January unremarkable.  Etiology of lightheadedness does not seem neurological    Plan  1. Check orthostatics  2. Cardiology eval in progress for cardiac MR  3. Agree consider endocrine eval hypoglycemic concern  4. Close observation, supportive care  Will follow  Plan reviewed with pt at bedside, who agrees.

## 2018-03-06 NOTE — PROGRESS NOTE ADULT - ASSESSMENT
71 yo male h/o cad s/p cabg, htn, vertigo a/w lightheadedness, presyncope    cards eval noted  MRI noted  f/u w/u per cards    neuro eval  check orthostatics  cont tele monitor    htn  uncontrolled  suggest add norvasc 5 daily    cont other home meds  dvt ppx 71 yo male h/o cad s/p cabg, htn, vertigo a/w lightheadedness, presyncope    cards eval noted  MRI noted  f/u w/u per cards  stress echo to w/u possible LVOT given hyperdynamic LV    neuro eval  check orthostatics  cont tele monitor    htn  uncontrolled  suggest add norvasc 5 daily    cont other home meds  dvt ppx

## 2018-03-07 LAB
GLUCOSE BLDC GLUCOMTR-MCNC: 100 MG/DL — HIGH (ref 70–99)
GLUCOSE BLDC GLUCOMTR-MCNC: 126 MG/DL — HIGH (ref 70–99)
GLUCOSE BLDC GLUCOMTR-MCNC: 85 MG/DL — SIGNIFICANT CHANGE UP (ref 70–99)

## 2018-03-07 PROCEDURE — 99233 SBSQ HOSP IP/OBS HIGH 50: CPT

## 2018-03-07 RX ORDER — METOPROLOL TARTRATE 50 MG
50 TABLET ORAL DAILY
Qty: 0 | Refills: 0 | Status: DISCONTINUED | OUTPATIENT
Start: 2018-03-08 | End: 2018-03-13

## 2018-03-07 RX ORDER — ZALEPLON 10 MG
5 CAPSULE ORAL ONCE
Qty: 0 | Refills: 0 | Status: DISCONTINUED | OUTPATIENT
Start: 2018-03-07 | End: 2018-03-08

## 2018-03-07 RX ORDER — AMLODIPINE BESYLATE 2.5 MG/1
2.5 TABLET ORAL DAILY
Qty: 0 | Refills: 0 | Status: DISCONTINUED | OUTPATIENT
Start: 2018-03-07 | End: 2018-03-07

## 2018-03-07 RX ADMIN — AMLODIPINE BESYLATE 2.5 MILLIGRAM(S): 2.5 TABLET ORAL at 11:09

## 2018-03-07 RX ADMIN — Medication 100 MILLIGRAM(S): at 09:53

## 2018-03-07 RX ADMIN — Medication 81 MILLIGRAM(S): at 11:09

## 2018-03-07 NOTE — PROGRESS NOTE ADULT - ASSESSMENT
71 yo male h/o cad s/p cabg, htn, vertigo a/w lightheadedness, presyncope    cards eval noted  MRI noted  as per d/w cards consider cardiac cath  bp meds adjusted as per cards    neuro eval noted  check orthostatics  cont tele monitor    htn  uncontrolled  metoprolol and Cardizem as ordered    cont other  meds  dvt ppx

## 2018-03-07 NOTE — PROGRESS NOTE ADULT - ASSESSMENT
A/P: 72 year old gentleman past medical history of CAD s/p CABG, HLD, admitted with episodic lightheadedness since ~ January. Pt does have a history of vertiginous dizziness which is distinct from his lightheadedness. Vertigo was positional and hospitalized 2 months ago for this. MRI Brain, MRA head and neck were unremarkable and symptoms responded to outpatient epley, vestibular PT. Pt followed by my associate Dr. Dunn. Pt reports lightheadedness is not postural or positional. Seems to worsen when feels 'hypoglycemic' but reports blood glucose has been running in good range. Does have IBS and intermittent diarrhea worse in hospital but reports generally remains well hydrated and does not feel lightheadedness is related to his fluid status. No headaches or vision changes, no focal facial or limb weakness, paresthesias. No hx of neuropathy.    Exam nonfocal    Symptoms not typical for his prior BPV and recent imaging MRI brain, MRA head and neck in January unremarkable.  Etiology of lightheadedness does not seem neurological    Plan  1. Check orthostatics  2. Cardiology eval in progress   3. Agree consider endocrine eval hypoglycemic concern  4. Close observation, supportive care  5. sleep/stress management reviewed  Plan reviewed with pt at bedside, who agrees.

## 2018-03-07 NOTE — PROGRESS NOTE ADULT - ASSESSMENT
72 year-old with known CAD status post remote CABG, recently admitted with BPPV that responded to outpatient Epley maneuver as outpatient (neurology office), now readmitted with persistent dizziness and symptoms that make him concerned for hypoglycemia.  Symptoms predate starting Valium or meclizine.    Consider endocrine evaluation if patient seen to have hypoglycemia.    Will review cardiac MRI, although it does not appear to offer etiology of symptom. Will consider fat pad biopsy as well.    Patient not interested in stress echo, as LVOT gradient suggested by rest echo.  Will continue beta-blocker at metoprolol succinate 50mg daily and add diltiazem 30mg PO Q6H for now.    If patient does not feel 100% better tomorrow morning, will check left heart catheterization to evaluate for anginal equivalent in patient with history of CAD and abnormal nuclear stress tests.

## 2018-03-08 LAB
ANION GAP SERPL CALC-SCNC: 11 MMOL/L — SIGNIFICANT CHANGE UP (ref 5–17)
APTT BLD: 30.3 SEC — SIGNIFICANT CHANGE UP (ref 27.5–37.4)
BUN SERPL-MCNC: 17 MG/DL — SIGNIFICANT CHANGE UP (ref 7–23)
CALCIUM SERPL-MCNC: 9.7 MG/DL — SIGNIFICANT CHANGE UP (ref 8.4–10.5)
CHLORIDE SERPL-SCNC: 101 MMOL/L — SIGNIFICANT CHANGE UP (ref 96–108)
CO2 SERPL-SCNC: 27 MMOL/L — SIGNIFICANT CHANGE UP (ref 22–31)
CREAT SERPL-MCNC: 1.34 MG/DL — HIGH (ref 0.5–1.3)
GLUCOSE BLDC GLUCOMTR-MCNC: 88 MG/DL — SIGNIFICANT CHANGE UP (ref 70–99)
GLUCOSE BLDC GLUCOMTR-MCNC: 95 MG/DL — SIGNIFICANT CHANGE UP (ref 70–99)
GLUCOSE BLDC GLUCOMTR-MCNC: 96 MG/DL — SIGNIFICANT CHANGE UP (ref 70–99)
GLUCOSE SERPL-MCNC: 110 MG/DL — HIGH (ref 70–99)
HBA1C BLD-MCNC: 5 % — SIGNIFICANT CHANGE UP (ref 4–5.6)
HCT VFR BLD CALC: 50.1 % — HIGH (ref 39–50)
HGB BLD-MCNC: 16.7 G/DL — SIGNIFICANT CHANGE UP (ref 13–17)
INR BLD: 1.02 RATIO — SIGNIFICANT CHANGE UP (ref 0.88–1.16)
MCHC RBC-ENTMCNC: 31.8 PG — SIGNIFICANT CHANGE UP (ref 27–34)
MCHC RBC-ENTMCNC: 33.3 GM/DL — SIGNIFICANT CHANGE UP (ref 32–36)
MCV RBC AUTO: 95.5 FL — SIGNIFICANT CHANGE UP (ref 80–100)
PLATELET # BLD AUTO: 235 K/UL — SIGNIFICANT CHANGE UP (ref 150–400)
POTASSIUM SERPL-MCNC: 4.5 MMOL/L — SIGNIFICANT CHANGE UP (ref 3.5–5.3)
POTASSIUM SERPL-SCNC: 4.5 MMOL/L — SIGNIFICANT CHANGE UP (ref 3.5–5.3)
PROTHROM AB SERPL-ACNC: 11.1 SEC — SIGNIFICANT CHANGE UP (ref 9.8–12.7)
RBC # BLD: 5.25 M/UL — SIGNIFICANT CHANGE UP (ref 4.2–5.8)
RBC # FLD: 12.1 % — SIGNIFICANT CHANGE UP (ref 10.3–14.5)
SODIUM SERPL-SCNC: 139 MMOL/L — SIGNIFICANT CHANGE UP (ref 135–145)
T3 SERPL-MCNC: 117 NG/DL — SIGNIFICANT CHANGE UP (ref 80–200)
T4 AB SER-ACNC: 8.6 UG/DL — SIGNIFICANT CHANGE UP (ref 4.6–12)
WBC # BLD: 6.1 K/UL — SIGNIFICANT CHANGE UP (ref 3.8–10.5)
WBC # FLD AUTO: 6.1 K/UL — SIGNIFICANT CHANGE UP (ref 3.8–10.5)

## 2018-03-08 PROCEDURE — 99152 MOD SED SAME PHYS/QHP 5/>YRS: CPT

## 2018-03-08 PROCEDURE — 93567 NJX CAR CTH SPRVLV AORTGRPHY: CPT

## 2018-03-08 PROCEDURE — 76937 US GUIDE VASCULAR ACCESS: CPT | Mod: 26

## 2018-03-08 PROCEDURE — 93459 L HRT ART/GRFT ANGIO: CPT | Mod: 26

## 2018-03-08 PROCEDURE — 99233 SBSQ HOSP IP/OBS HIGH 50: CPT

## 2018-03-08 RX ORDER — ZALEPLON 10 MG
5 CAPSULE ORAL AT BEDTIME
Qty: 0 | Refills: 0 | Status: DISCONTINUED | OUTPATIENT
Start: 2018-03-08 | End: 2018-03-13

## 2018-03-08 RX ADMIN — Medication 5 MILLIGRAM(S): at 00:15

## 2018-03-08 RX ADMIN — Medication 50 MILLIGRAM(S): at 09:06

## 2018-03-08 NOTE — PROGRESS NOTE ADULT - ASSESSMENT
72 year-old with known CAD status post remote CABG, recently admitted with BPPV that responded to outpatient Epley maneuver as outpatient (neurology office), now readmitted with persistent dizziness and symptoms that make him concerned for hypoglycemia.  Symptoms predate starting Valium or meclizine.  Left heart cath performed today showed two patent grafts and otherwise well perfused native vessels - no evidence of obstructive CAD as etiology of symptom.  Consider endocrine evaluation if patient seen to have hypoglycemia.    Will continue beta-blocker at metoprolol succinate 50mg daily and calcium channel blocker at diltiazem 30mg PO Q6H for now.  Plan to uptitrate beta-blocker and calcium channel blocker as BP and HR permit. 72 year-old with known CAD status post remote CABG, recently admitted with BPPV that responded to outpatient Epley maneuver as outpatient (neurology office), now readmitted with persistent dizziness and symptoms that make him concerned for hypoglycemia.  Symptoms predate starting Valium or meclizine.  Left heart cath performed today showed two patent grafts and otherwise well perfused native vessels - no evidence of obstructive CAD as etiology of symptom.  Consider endocrine evaluation if patient seen to have hypoglycemia.    Will continue beta-blocker at metoprolol succinate 50mg daily and calcium channel blocker at diltiazem 30mg PO Q6H for now.  Plan to uptitrate beta-blocker and calcium channel blocker as BP and HR permit to address LVOT gradient.

## 2018-03-08 NOTE — PROGRESS NOTE ADULT - ASSESSMENT
73 yo male h/o cad s/p cabg, htn, vertigo a/w lightheadedness, presyncope    cards eval noted  MRI noted  plan for cardiac cath today  bp meds adjusted as per cards  pt still states feeling lightheaded    neuro f/u    htn  uncontrolled  metoprolol and Cardizem as ordered  cards f/u    cont other  meds  dvt ppx

## 2018-03-09 ENCOUNTER — TRANSCRIPTION ENCOUNTER (OUTPATIENT)
Age: 73
End: 2018-03-09

## 2018-03-09 DIAGNOSIS — E27.49 OTHER ADRENOCORTICAL INSUFFICIENCY: ICD-10-CM

## 2018-03-09 DIAGNOSIS — R42 DIZZINESS AND GIDDINESS: ICD-10-CM

## 2018-03-09 DIAGNOSIS — E16.2 HYPOGLYCEMIA, UNSPECIFIED: ICD-10-CM

## 2018-03-09 LAB
ANION GAP SERPL CALC-SCNC: 11 MMOL/L — SIGNIFICANT CHANGE UP (ref 5–17)
BUN SERPL-MCNC: 18 MG/DL — SIGNIFICANT CHANGE UP (ref 7–23)
CALCIUM SERPL-MCNC: 9 MG/DL — SIGNIFICANT CHANGE UP (ref 8.4–10.5)
CHLORIDE SERPL-SCNC: 99 MMOL/L — SIGNIFICANT CHANGE UP (ref 96–108)
CO2 SERPL-SCNC: 26 MMOL/L — SIGNIFICANT CHANGE UP (ref 22–31)
CREAT SERPL-MCNC: 1.27 MG/DL — SIGNIFICANT CHANGE UP (ref 0.5–1.3)
GLUCOSE BLDC GLUCOMTR-MCNC: 105 MG/DL — HIGH (ref 70–99)
GLUCOSE BLDC GLUCOMTR-MCNC: 97 MG/DL — SIGNIFICANT CHANGE UP (ref 70–99)
GLUCOSE SERPL-MCNC: 91 MG/DL — SIGNIFICANT CHANGE UP (ref 70–99)
HBA1C BLD-MCNC: 5 % — SIGNIFICANT CHANGE UP (ref 4–5.6)
HCT VFR BLD CALC: 43.6 % — SIGNIFICANT CHANGE UP (ref 39–50)
HGB BLD-MCNC: 14.5 G/DL — SIGNIFICANT CHANGE UP (ref 13–17)
MCHC RBC-ENTMCNC: 31.4 PG — SIGNIFICANT CHANGE UP (ref 27–34)
MCHC RBC-ENTMCNC: 33.3 GM/DL — SIGNIFICANT CHANGE UP (ref 32–36)
MCV RBC AUTO: 94.4 FL — SIGNIFICANT CHANGE UP (ref 80–100)
PLATELET # BLD AUTO: 195 K/UL — SIGNIFICANT CHANGE UP (ref 150–400)
POTASSIUM SERPL-MCNC: 3.7 MMOL/L — SIGNIFICANT CHANGE UP (ref 3.5–5.3)
POTASSIUM SERPL-SCNC: 3.7 MMOL/L — SIGNIFICANT CHANGE UP (ref 3.5–5.3)
RBC # BLD: 4.62 M/UL — SIGNIFICANT CHANGE UP (ref 4.2–5.8)
RBC # FLD: 13.1 % — SIGNIFICANT CHANGE UP (ref 10.3–14.5)
SODIUM SERPL-SCNC: 136 MMOL/L — SIGNIFICANT CHANGE UP (ref 135–145)
WBC # BLD: 7.5 K/UL — SIGNIFICANT CHANGE UP (ref 3.8–10.5)
WBC # FLD AUTO: 7.5 K/UL — SIGNIFICANT CHANGE UP (ref 3.8–10.5)

## 2018-03-09 PROCEDURE — 99233 SBSQ HOSP IP/OBS HIGH 50: CPT

## 2018-03-09 PROCEDURE — 99222 1ST HOSP IP/OBS MODERATE 55: CPT | Mod: GC

## 2018-03-09 RX ORDER — SODIUM CHLORIDE 9 MG/ML
1000 INJECTION INTRAMUSCULAR; INTRAVENOUS; SUBCUTANEOUS
Qty: 0 | Refills: 0 | Status: DISCONTINUED | OUTPATIENT
Start: 2018-03-09 | End: 2018-03-13

## 2018-03-09 RX ADMIN — Medication 5 MILLIGRAM(S): at 00:07

## 2018-03-09 RX ADMIN — Medication 50 MILLIGRAM(S): at 11:55

## 2018-03-09 RX ADMIN — Medication 81 MILLIGRAM(S): at 11:55

## 2018-03-09 NOTE — CONSULT NOTE ADULT - PROBLEM SELECTOR RECOMMENDATION 2
- patient found to have low AM cortisol in January 2018  - if remains inpatient, recommend recheck AM cortisol, otherwise recheck as outpatient - patient found to have low AM cortisol in January 2018  - if remains inpatient, recommend recheck AM cortisol and ACTH level, otherwise recheck as outpatient

## 2018-03-09 NOTE — DISCHARGE NOTE ADULT - ADDITIONAL INSTRUCTIONS
Make appointments for follow up with your physicians  Bring all discharge paperwork including discharge medication lis to follow up appointments Make appointments for follow up with your physicians  Bring all discharge paperwork including discharge medication lis to follow up appointments  Follow up with Dr Lowery within 1 week

## 2018-03-09 NOTE — DISCHARGE NOTE ADULT - FINDINGS/TREATMENT
3/8/18: Left Heart Catheterization: VENTRICLES: No left ventriculogram was performed.  CORONARY VESSELS: The coronary circulation is right dominant.  LM:   --  LM: Normal.  LAD:   --  Proximal LAD: There was a tubular 50 % stenosis. There was good blood supply to the distal myocardium from a graft.  CX:   --  OM1: There was a 100 % stenosis. There was good blood supply to the distal myocardium from a graft.  --  OM2: The vessel was small sized. Angiography showed mild atherosclerosis with no flow limiting lesions.  RI:   --  Ramus intermedius: The vessel was medium sized. Angiography showed minor luminal irregularities with no flow limiting lesions.  RCA:   --  Proximal RCA: There was a 100 % stenosis. There was good collateral blood supply to the distal myocardium from the LAD and LCx.  GRAFTS:   --  Graft to the distal LAD: The graft was a LIMA. There was a 40% stenosis at the distal anastomosis.  --  Graft to the 1st obtuse marginal: The graft was a saphenous vein graft from the aorta. Graft angiography showed minor luminal irregularities.  --  Graft to the distal RCA: The graft was a saphenous vein graft from the aorta. There was a 100 % stenosis at the proximal anastomosis.  AORTA: The root exhibited normal size.  COMPLICATIONS: There were no complications.  DIAGNOSTIC IMPRESSIONS: Patent LIMA-dLAD, Patent SVG-Om. Occluded SVG-RCA   (RCA well collateralized).  DIAGNOSTIC RECOMMENDATIONS: Medical therapy. 3/6/18: MRI Cardiac:   IMPRESSION: Mid wall myocardial delayed hyperenhancement involving the inferior wall of the left ventricle extending from the base towards the mid left ventricle. Differential diagnosis include infiltrative   cardiomyopathy including sarcoidosis or myocarditis. The constellation of findings would be atypical for cardiac amyloidosis. 3/5/18: CT Scan - Head: IMPRESSION:  No hemorrhage or midline shift,  volume loss, microvascular disease with age indeterminate lacunar infarcts, if symptoms persist consider follow-up CT or MRI if no contraindications 3/6/18: MRI Cardiac:   IMPRESSION: Mid wall myocardial delayed hyper enhancement involving the inferior wall of the left ventricle extending from the base towards the mid left ventricle. Differential diagnosis include infiltrative   cardiomyopathy including sarcoidosis or myocarditis. The constellation of findings would be atypical for cardiac amyloidosis.

## 2018-03-09 NOTE — PROGRESS NOTE ADULT - ASSESSMENT
72 year-old with known CAD status post remote CABG, recently admitted with BPPV that responded to outpatient Epley maneuver as outpatient (neurology office), now readmitted with persistent dizziness and symptoms that make him concerned for hypoglycemia.  Symptoms predate starting Valium or meclizine.  Left heart cath performed 3/8/2018 showed two patent grafts and otherwise well perfused native vessels - no evidence of obstructive CAD as etiology of symptom.  Follow-up endocrine evaluation, although hypoglycemia has never been documented.     Will continue beta-blocker at metoprolol succinate 50mg daily.  Uptitrate to calcium channel blocker at diltiazem 60mg PO Q6H for now.  Plan to uptitrate beta-blocker and calcium channel blocker as BP and HR permit to address LVOT gradient.  Continue IVF to increase preload in patient with evidence of LVOT gradient on echo and cardiac MRI.

## 2018-03-09 NOTE — DISCHARGE NOTE ADULT - CARE PLAN
Principal Discharge DX:	Lightheadedness  Goal:	resolved  Assessment and plan of treatment:	follow up with your physicians  Secondary Diagnosis:	Coronary artery disease  Goal:	risk modification  Assessment and plan of treatment:	Coronary artery disease is a condition where the arteries the supply the heart muscle get clogged with fatty deposits & puts you at risk for a heart attack  Call your doctor if you have any new pain, pressure, or discomfort in the center of your chest, pain, tingling or discomfort in arms, back, neck, jaw, or stomach, shortness of breath, nausea, vomiting, burping or heartburn, sweating, cold and clammy skin, racing or abnormal heartbeat for more than 10 minutes or if they keep coming & going.  Call 911 and do not tr to get to hospital by care  You can help yourself with lefestyle changes (quitting smoking if you smoke), eat lots of fruits & vegetables & low fat dairy products, not a lot of meat & fatty foods, walk or some form of physical activity most days of the week, lose weight if you are overweight  Take your cardiac medication as prescribed to lower cholesterol, to lower blood pressure, aspirin to prevent blood clots, and diabetes control  Make sure to keep appointments with doctor for cardiac follow up care

## 2018-03-09 NOTE — DISCHARGE NOTE ADULT - HOSPITAL COURSE
72 year old gentleman past medical history of CAD s/p CABG, HLD, admitted with episodic lightheadedness since ~ January. Pt does have a history of vertiginous dizziness which is distinct from his lightheadedness. Vertigo was positional and hospitalized 2 months ago for this. MRI Brain, MRA head and neck were unremarkable and symptoms responded to outpatient epley, vestibular PT. Pt followed by Dr. Dunn (neurology). Pt reported lightheadedness is not postural or positional. Seems to worsen when feels 'hypoglycemic' but reports blood glucose has been running in good range.   Left heart cath performed 3/8/18 showed two patent grafts and otherwise well perfused native vessels - no evidence of obstructive CAD as etiology of symptom.  Endocrine consulted 72 year old gentleman past medical history of CAD s/p CABG, HLD, admitted with episodic lightheadedness since ~ January. Pt does have a history of vertiginous dizziness which is distinct from his lightheadedness. Vertigo was positional and hospitalized 2 months ago for this. MRI Brain, MRA head and neck were unremarkable and symptoms responded to outpatient epley, vestibular PT. Pt followed by Dr. Dunn (neurology). Pt reported lightheadedness is not postural or positional. Seems to worsen when feels 'hypoglycemic' but reports blood glucose has been running in good range.   Left heart cath performed 3/8/18 showed two patent grafts and otherwise well perfused native vessels - no evidence of obstructive CAD as etiology of symptom.  Endocrine consulted       pts presyncope likely 2/2 LVOT

## 2018-03-09 NOTE — DISCHARGE NOTE ADULT - PATIENT PORTAL LINK FT
You can access the [facility] Patient Portal, offered by [facility], by registering with the following website: [email or web address]

## 2018-03-09 NOTE — DISCHARGE NOTE ADULT - MEDICATION SUMMARY - MEDICATIONS TO TAKE
I will START or STAY ON the medications listed below when I get home from the hospital:    aspirin 81 mg oral delayed release tablet  -- 1 tab(s) by mouth once a day  -- Indication: For Preventive measure    dilTIAZem 60 mg oral tablet  -- 1 tab(s) by mouth every 6 hours  -- Indication: For Coronary artery disease    meclizine 12.5 mg oral tablet  -- 1 tab(s) by mouth 2 times a day, As Needed  -- Indication: For Dizziness and giddiness    Crestor 5 mg oral tablet  -- 1 tab(s) by mouth once a day (at bedtime)  -- Indication: For Hyperlipidemia    metoprolol succinate 50 mg oral tablet, extended release  -- 1 tab(s) by mouth once a day  -- Indication: For Coronary artery disease I will START or STAY ON the medications listed below when I get home from the hospital:    aspirin 81 mg oral delayed release tablet  -- 1 tab(s) by mouth once a day  -- Indication: For Preventive measure    dilTIAZem 240 mg/24 hours oral capsule, extended release  -- 1 cap(s) by mouth once a day  -- Indication: For Coronary artery disease    meclizine 12.5 mg oral tablet  -- 1 tab(s) by mouth 2 times a day, As Needed  -- Indication: For Dizziness and giddiness    Crestor 5 mg oral tablet  -- 1 tab(s) by mouth once a day (at bedtime)  -- Indication: For Hyperlipidemia    metoprolol succinate 50 mg oral tablet, extended release  -- 1 tab(s) by mouth once a day  -- Indication: For Coronary artery disease

## 2018-03-09 NOTE — CONSULT NOTE ADULT - SUBJECTIVE AND OBJECTIVE BOX
HPI:  71yo m pmh CAD s/p CABG, HLD, p/w lightheadedness. Pt states intermittent lightheadedness for past few months, was seen at this hospital two months ago and told had BPPV but pt explains that this lightheadedness is different from the dizziness at that time. Pt denies chest pain and palpitations. Pt feels symptoms are 2/2 hypoglycemia because they seem to improve with meals however pt has not checked his FS during episodes. No fevers, chills, chest pain, SOB, abdominal pain, urinary symptoms. (05 Mar 2018 15:36)    Endocrine History:  Patient does not have a prior history of DM. He reports a history of DM2 in extended family members. Only medications he takes at home are Crestor, ASA, Metoprolol. He states that for months now, he has had lightheadeness associated with some vertigo. He was diagnosed with BPPV and had the Epley manuever performed, which resolved the vertigo. However the lightheadedness continues. He experiences it at any time of the day. He states that he has symptoms of hypoglycemia (tremors, blurry vision) at certain time a well. When drinks juice or eats, the symptoms are improved. He has not had documented hypoglycemia while inpatient and his HbA1c is 4.9/5.0. He states that whenever he feels the symptoms of hypoglycemia he eats or drinks something. He reports symptoms of dry mouth, polyuria for months now. Recently, he has developed polyphagia as well. Also reports having intermittent hypertension, had plasma metanephrines and urine metanephrines which were reportedly normal. States he does not develop tachycardia because he is on a beta blocker.     AM cortisol on January 18 2018 was 4.3. Patient has not been on steroids.       PAST MEDICAL & SURGICAL HISTORY:  Vertigo  Coronary artery disease  S/P CABG x 4      FAMILY HISTORY:  Family history of hypertension (Sibling)      Social History:  No cigarette or alcohol use  Former cardiologist at [employer]    Outpatient Medications:  · 	Metoprolol Tartrate 100 mg oral tablet: 1 tab(s) orally once a day, Last Dose Taken:    · 	Crestor 5 mg oral tablet: 1 tab(s) orally once a day (at bedtime), Last Dose Taken: 19-Feb-2018   · 	aspirin 81 mg oral delayed release tablet: 1 tab(s) orally once a day, Last Dose Taken:    · 	meclizine 12.5 mg oral tablet: 1 tab(s) orally 2 times a day, As Needed, Last Dose Taken:      MEDICATIONS  (STANDING):  aspirin enteric coated 81 milliGRAM(s) Oral daily  atorvastatin 20 milliGRAM(s) Oral at bedtime  diltiazem    Tablet 60 milliGRAM(s) Oral every 6 hours  heparin  Injectable 5000 Unit(s) SubCutaneous every 8 hours  metoprolol succinate ER 50 milliGRAM(s) Oral daily  sodium chloride 0.9%. 1000 milliLiter(s) (75 mL/Hr) IV Continuous <Continuous>    MEDICATIONS  (PRN):  meclizine 12.5 milliGRAM(s) Oral two times a day PRN Dizziness  zaleplon 5 milliGRAM(s) Oral at bedtime PRN Insomnia      Allergies  No Known Allergies    Review of Systems:  Constitutional: No fever  Eyes: + blurry vision  Neuro: + tremors  HEENT: No pain  Cardiovascular: No chest pain, palpitations  Respiratory: No SOB, no cough  GI: No nausea, vomiting, abdominal pain  : No dysuria  Skin: no rash  Psych: no depression; + anxiety  Endocrine: + polyuria, no polydipsia      ALL OTHER SYSTEMS REVIEWED AND NEGATIVE      PHYSICAL EXAM:  VITALS: T(C): 36.7 (03-09-18 @ 11:14)  T(F): 98.1 (03-09-18 @ 11:14), Max: 99 (03-09-18 @ 00:05)  HR: 60 (03-09-18 @ 13:45) (55 - 64)  BP: 127/74 (03-09-18 @ 13:45) (127/74 - 168/85)  RR:  (18 - 18)  SpO2:  (97% - 98%)  Wt(kg): --  GENERAL: NAD, well-groomed, well-developed  EYES: No proptosis, anicteric  HEENT:  Atraumatic, Normocephalic, moist mucous membranes  THYROID: Normal size, no palpable nodules  RESPIRATORY: Clear to auscultation bilaterally; No rales, rhonchi, wheezing  CARDIOVASCULAR: Regular rate and rhythm; No murmurs; no peripheral edema  GI: Soft, nontender, non distended, normal bowel sounds  SKIN: Dry, intact, No rashes or lesions  MUSCULOSKELETAL: Full range of motion, normal strength  PSYCH: Alert and oriented x 3, reactive affect, normal mood      POCT Blood Glucose.: 97 mg/dL (03-09-18 @ 12:04)  POCT Blood Glucose.: 105 mg/dL (03-09-18 @ 07:49)  POCT Blood Glucose.: 96 mg/dL (03-08-18 @ 17:56)  POCT Blood Glucose.: 95 mg/dL (03-08-18 @ 12:10)  POCT Blood Glucose.: 88 mg/dL (03-08-18 @ 08:03)  POCT Blood Glucose.: 126 mg/dL (03-07-18 @ 16:44)  POCT Blood Glucose.: 100 mg/dL (03-07-18 @ 10:22)  POCT Blood Glucose.: 85 mg/dL (03-07-18 @ 07:28)  POCT Blood Glucose.: 96 mg/dL (03-06-18 @ 21:32)  POCT Blood Glucose.: 122 mg/dL (03-06-18 @ 16:32)                          14.5   7.50  )-----------( 195      ( 09 Mar 2018 08:07 )             43.6       03-09    136  |  99  |  18  ----------------------------<  91  3.7   |  26  |  1.27    EGFR if : 65  EGFR if non : 56<L>    Ca    9.0      03-09        Thyroid Function Tests:  03-08 @ 15:20 TSH -- FreeT4 -- T3 117 Anti TPO -- Anti Thyroglobulin Ab -- TSI --  03-05 @ 13:14 TSH 1.26 FreeT4 -- T3 -- Anti TPO -- Anti Thyroglobulin Ab -- TSI --      Hemoglobin A1C, Whole Blood: 5.0 % [4.0 - 5.6] (03-09-18 @ 08:07)  Hemoglobin A1C, Whole Blood: 5.0 % [4.0 - 5.6] (03-08-18 @ 15:23)  Hemoglobin A1C, Whole Blood: 4.9 % [4.0 - 5.6] (01-18-18 @ 07:57) HPI:  73yo m pmh CAD s/p CABG, HLD, p/w lightheadedness. Pt states intermittent lightheadedness for past few months, was seen at this hospital two months ago and told had BPPV but pt explains that this lightheadedness is different from the dizziness at that time. Pt denies chest pain and palpitations. Pt feels symptoms are 2/2 hypoglycemia because they seem to improve with meals however pt has not checked his FS during episodes. No fevers, chills, chest pain, SOB, abdominal pain, urinary symptoms. (05 Mar 2018 15:36)    Endocrine History:  Patient does not have a prior history of DM. He reports a history of DM2 in extended family members. Only medications he takes at home are Crestor, ASA, Metoprolol. He states that for months now, he has had lightheadeness associated with some vertigo. He was diagnosed with BPPV and had the Epley manuever performed, which resolved the vertigo. However the lightheadedness continues. He experiences it at any time of the day. He states that he has symptoms of hypoglycemia (tremors, blurry vision) at certain time a well. When drinks juice or eats, the symptoms are improved. He has not had documented hypoglycemia while inpatient and his HbA1c is 4.9/5.0. He states that whenever he feels the symptoms of hypoglycemia he eats or drinks something. He reports symptoms of dry mouth, polyuria for months now. Recently, he has developed polyphagia as well. Also reports having intermittent hypertension, had plasma metanephrines done in October and November 2017 which were slightly elevated x 2 and urine metanephrines which were normal. States he does not develop tachycardia because he is on a beta blocker.     AM cortisol on January 18 2018 was 4.3. Patient has not been on steroids.       PAST MEDICAL & SURGICAL HISTORY:  Vertigo  Coronary artery disease  S/P CABG x 4      FAMILY HISTORY:  Family history of hypertension (Sibling)      Social History:  No cigarette or alcohol use  Former cardiologist at [employer]    Outpatient Medications:  · 	Metoprolol Tartrate 100 mg oral tablet: 1 tab(s) orally once a day, Last Dose Taken:    · 	Crestor 5 mg oral tablet: 1 tab(s) orally once a day (at bedtime), Last Dose Taken: 19-Feb-2018   · 	aspirin 81 mg oral delayed release tablet: 1 tab(s) orally once a day, Last Dose Taken:    · 	meclizine 12.5 mg oral tablet: 1 tab(s) orally 2 times a day, As Needed, Last Dose Taken:      MEDICATIONS  (STANDING):  aspirin enteric coated 81 milliGRAM(s) Oral daily  atorvastatin 20 milliGRAM(s) Oral at bedtime  diltiazem    Tablet 60 milliGRAM(s) Oral every 6 hours  heparin  Injectable 5000 Unit(s) SubCutaneous every 8 hours  metoprolol succinate ER 50 milliGRAM(s) Oral daily  sodium chloride 0.9%. 1000 milliLiter(s) (75 mL/Hr) IV Continuous <Continuous>    MEDICATIONS  (PRN):  meclizine 12.5 milliGRAM(s) Oral two times a day PRN Dizziness  zaleplon 5 milliGRAM(s) Oral at bedtime PRN Insomnia      Allergies  No Known Allergies    Review of Systems:  Constitutional: No fever  Eyes: + blurry vision  Neuro: + tremors  HEENT: No pain  Cardiovascular: No chest pain, palpitations  Respiratory: No SOB, no cough  GI: No nausea, vomiting, abdominal pain  : No dysuria  Skin: no rash  Psych: no depression; + anxiety  Endocrine: + polyuria, no polydipsia      ALL OTHER SYSTEMS REVIEWED AND NEGATIVE      PHYSICAL EXAM:  VITALS: T(C): 36.7 (03-09-18 @ 11:14)  T(F): 98.1 (03-09-18 @ 11:14), Max: 99 (03-09-18 @ 00:05)  HR: 60 (03-09-18 @ 13:45) (55 - 64)  BP: 127/74 (03-09-18 @ 13:45) (127/74 - 168/85)  RR:  (18 - 18)  SpO2:  (97% - 98%)  Wt(kg): --  GENERAL: NAD, well-groomed, well-developed  EYES: No proptosis, anicteric  HEENT:  Atraumatic, Normocephalic, moist mucous membranes  THYROID: Normal size, no palpable nodules  RESPIRATORY: Clear to auscultation bilaterally; No rales, rhonchi, wheezing  CARDIOVASCULAR: Regular rate and rhythm; No murmurs; no peripheral edema  GI: Soft, nontender, non distended, normal bowel sounds  SKIN: Dry, intact, No rashes or lesions  MUSCULOSKELETAL: Full range of motion, normal strength  PSYCH: Alert and oriented x 3, reactive affect, normal mood      POCT Blood Glucose.: 97 mg/dL (03-09-18 @ 12:04)  POCT Blood Glucose.: 105 mg/dL (03-09-18 @ 07:49)  POCT Blood Glucose.: 96 mg/dL (03-08-18 @ 17:56)  POCT Blood Glucose.: 95 mg/dL (03-08-18 @ 12:10)  POCT Blood Glucose.: 88 mg/dL (03-08-18 @ 08:03)  POCT Blood Glucose.: 126 mg/dL (03-07-18 @ 16:44)  POCT Blood Glucose.: 100 mg/dL (03-07-18 @ 10:22)  POCT Blood Glucose.: 85 mg/dL (03-07-18 @ 07:28)  POCT Blood Glucose.: 96 mg/dL (03-06-18 @ 21:32)  POCT Blood Glucose.: 122 mg/dL (03-06-18 @ 16:32)                          14.5   7.50  )-----------( 195      ( 09 Mar 2018 08:07 )             43.6       03-09    136  |  99  |  18  ----------------------------<  91  3.7   |  26  |  1.27    EGFR if : 65  EGFR if non : 56<L>    Ca    9.0      03-09        Thyroid Function Tests:  03-08 @ 15:20 TSH -- FreeT4 -- T3 117 Anti TPO -- Anti Thyroglobulin Ab -- TSI --  03-05 @ 13:14 TSH 1.26 FreeT4 -- T3 -- Anti TPO -- Anti Thyroglobulin Ab -- TSI --      Hemoglobin A1C, Whole Blood: 5.0 % [4.0 - 5.6] (03-09-18 @ 08:07)  Hemoglobin A1C, Whole Blood: 5.0 % [4.0 - 5.6] (03-08-18 @ 15:23)  Hemoglobin A1C, Whole Blood: 4.9 % [4.0 - 5.6] (01-18-18 @ 07:57)

## 2018-03-09 NOTE — PROGRESS NOTE ADULT - ASSESSMENT
A/P: 72 year old gentleman past medical history of CAD s/p CABG, HLD, admitted with episodic lightheadedness since ~ January. Pt does have a history of vertiginous dizziness which is distinct from his lightheadedness. Vertigo was positional and hospitalized 2 months ago for this. MRI Brain, MRA head and neck were unremarkable and symptoms responded to outpatient epley, vestibular PT. Pt followed by my associate Dr. Dunn. Pt reports lightheadedness is not postural or positional. Seems to worsen when feels 'hypoglycemic' but reports blood glucose has been running in good range. Does have IBS and intermittent diarrhea worse in hospital but reports generally remains well hydrated and does not feel lightheadedness is related to his fluid status. No headaches or vision changes, no focal facial or limb weakness, paresthesias. No hx of neuropathy.    Exam nonfocal    Symptoms not typical for his prior BPV and recent imaging MRI brain, MRA head and neck in January unremarkable.  Etiology of lightheadedness does not seem neurological    Plan  1. Check orthostatics  2. Cardiology follow up  3. Optimize glycemic control  4. Close observation, supportive care  5. sleep/stress management reviewed  6. Follow up with Dr. Dunn as outpatient  No further inpatient reccs, will sign off, reconsult as needed.   Plan reviewed with pt at bedside, who agrees.

## 2018-03-09 NOTE — PROGRESS NOTE ADULT - ASSESSMENT
71 yo male h/o cad s/p cabg, htn, vertigo a/w lightheadedness, presyncope    cards eval noted  MRI noted  cath noted  medical mngt  bp meds adjusted as per cards  further mngt as outpt    neuro f/u noted  no further inpt w/u    pt with mildly low finger sticks  endo consulted  to see pt today    htn  uncontrolled  metoprolol and Cardizem as ordered  cards f/u    cont other  meds  dvt ppx    dc home today with outpt f/u if no further inpt w/u by endo or cards

## 2018-03-09 NOTE — DISCHARGE NOTE ADULT - CARE PROVIDERS DIRECT ADDRESSES
[email or web address]

## 2018-03-09 NOTE — DISCHARGE NOTE ADULT - MEDICATION SUMMARY - MEDICATIONS TO CHANGE
I will SWITCH the dose or number of times a day I take the medications listed below when I get home from the hospital:    Metoprolol Tartrate 100 mg oral tablet  -- 1 tab(s) by mouth once a day

## 2018-03-09 NOTE — CONSULT NOTE ADULT - PROBLEM SELECTOR RECOMMENDATION 9
- patient does not have documented hypoglycemia  - if remains inpatient, would need to document hypoglycemia. Can perform 72 hour fast to provoke hypoglycemia. If patient has symptoms of hypoglycemia, check FS - if 65 or less, send stat BMP, c-peptide, BHB, sulfonylurea screen, pro-insulin level, insulin level. This work up can also be performed as outpatient  - he can follow up in office as outpatient - [phone] - patient does not have documented hypoglycemia  - if remains inpatient, would need to document hypoglycemia. Can perform 72 hour fast to provoke hypoglycemia. If patient has symptoms of hypoglycemia, check FS - if 65 or less, send stat BMP, cortisol, c-peptide, BHB, sulfonylurea screen, pro-insulin level, insulin level. This work up can also be performed as outpatient  - he can follow up in office as outpatient - [phone]

## 2018-03-09 NOTE — DISCHARGE NOTE ADULT - CARE PROVIDER_API CALL
Zay Lowery), Cardiology; Internal Medicine  [address]  [address]  [place] [address]  Phone: [phone]  Fax: [phone]    Malena Montgomery), EndocrinologyMetabDiabetes; Internal Medicine  [address]  [address]  [place] [address]  Phone: [phone]  Fax: [phone]

## 2018-03-09 NOTE — CONSULT NOTE ADULT - ATTENDING COMMENTS
Agree with assessment and plan as above by Dr. Pond. Reviewed all pertinent labs, glucose values, and imaging studies. Modifications made as indicated above.     Otf Alvarez D.O  [phone]

## 2018-03-09 NOTE — DISCHARGE NOTE ADULT - PLAN OF CARE
resolved follow up with your physicians risk modification Coronary artery disease is a condition where the arteries the supply the heart muscle get clogged with fatty deposits & puts you at risk for a heart attack  Call your doctor if you have any new pain, pressure, or discomfort in the center of your chest, pain, tingling or discomfort in arms, back, neck, jaw, or stomach, shortness of breath, nausea, vomiting, burping or heartburn, sweating, cold and clammy skin, racing or abnormal heartbeat for more than 10 minutes or if they keep coming & going.  Call 911 and do not tr to get to hospital by care  You can help yourself with lefestyle changes (quitting smoking if you smoke), eat lots of fruits & vegetables & low fat dairy products, not a lot of meat & fatty foods, walk or some form of physical activity most days of the week, lose weight if you are overweight  Take your cardiac medication as prescribed to lower cholesterol, to lower blood pressure, aspirin to prevent blood clots, and diabetes control  Make sure to keep appointments with doctor for cardiac follow up care

## 2018-03-10 LAB
ACTH SER-ACNC: 20 PG/ML — SIGNIFICANT CHANGE UP (ref 0–46)
CORTIS AM PEAK SERPL-MCNC: 12.4 UG/DL — SIGNIFICANT CHANGE UP (ref 6–18.4)
GLUCOSE BLDC GLUCOMTR-MCNC: 99 MG/DL — SIGNIFICANT CHANGE UP (ref 70–99)

## 2018-03-10 PROCEDURE — 99232 SBSQ HOSP IP/OBS MODERATE 35: CPT

## 2018-03-10 RX ADMIN — Medication 50 MILLIGRAM(S): at 08:48

## 2018-03-10 RX ADMIN — Medication 81 MILLIGRAM(S): at 12:44

## 2018-03-10 RX ADMIN — Medication 5 MILLIGRAM(S): at 00:04

## 2018-03-10 NOTE — PROGRESS NOTE ADULT - ASSESSMENT
Lightheadedness  LV outflow gradient  Slowly increase beta blockade and diltiazem to lower the outflow gradient

## 2018-03-10 NOTE — PROGRESS NOTE ADULT - ASSESSMENT
73 yo male h/o cad s/p cabg, htn, vertigo a/w lightheadedness, presyncope    cards eval noted  MRI noted  cath noted  medical mngt  bp meds adjusted as per cards  further mngt as outpt    neuro f/u noted  no further inpt w/u    pt with mildly low finger sticks  endo consult noted  no evid of hypoglycemia  outpt f/u    htn  uncontrolled  metoprolol and Cardizem as ordered  cards f/u    cont other  meds  dvt ppx    dc home with outpt f/u

## 2018-03-11 LAB — GLUCOSE BLDC GLUCOMTR-MCNC: 90 MG/DL — SIGNIFICANT CHANGE UP (ref 70–99)

## 2018-03-11 PROCEDURE — 99232 SBSQ HOSP IP/OBS MODERATE 35: CPT

## 2018-03-11 RX ORDER — ROSUVASTATIN CALCIUM 5 MG/1
5 TABLET ORAL AT BEDTIME
Qty: 0 | Refills: 0 | Status: DISCONTINUED | OUTPATIENT
Start: 2018-03-11 | End: 2018-03-13

## 2018-03-11 RX ADMIN — Medication 50 MILLIGRAM(S): at 09:06

## 2018-03-11 RX ADMIN — Medication 30 MILLILITER(S): at 22:50

## 2018-03-11 RX ADMIN — Medication 5 MILLIGRAM(S): at 00:13

## 2018-03-11 RX ADMIN — Medication 81 MILLIGRAM(S): at 12:32

## 2018-03-11 RX ADMIN — ROSUVASTATIN CALCIUM 5 MILLIGRAM(S): 5 TABLET ORAL at 22:50

## 2018-03-11 NOTE — PROGRESS NOTE ADULT - ASSESSMENT
73 yo male h/o cad s/p cabg, htn, vertigo a/w lightheadedness, presyncope    cards eval noted  MRI noted  cath noted  medical mngt  bp meds adjusted as per cards  further mngt as outpt    neuro f/u noted  no further inpt w/u    pt with mildly low finger sticks  endo consult noted  no evid of hypoglycemia  outpt f/u    htn  uncontrolled  metoprolol and Cardizem as ordered  cards f/u    cont other  meds  dvt ppx  dc planning  dc home with outpt f/u

## 2018-03-11 NOTE — PROGRESS NOTE ADULT - ASSESSMENT
Hemodynamically stable.  Continue beta blockade and diltiazem  Discharge home tomorrow, if stable overnight

## 2018-03-12 LAB — GLUCOSE BLDC GLUCOMTR-MCNC: 89 MG/DL — SIGNIFICANT CHANGE UP (ref 70–99)

## 2018-03-12 PROCEDURE — 93010 ELECTROCARDIOGRAM REPORT: CPT

## 2018-03-12 PROCEDURE — 99233 SBSQ HOSP IP/OBS HIGH 50: CPT

## 2018-03-12 RX ORDER — DILTIAZEM HCL 120 MG
1 CAPSULE, EXT RELEASE 24 HR ORAL
Qty: 30 | Refills: 0 | OUTPATIENT
Start: 2018-03-12 | End: 2018-04-10

## 2018-03-12 RX ORDER — METOPROLOL TARTRATE 50 MG
1 TABLET ORAL
Qty: 30 | Refills: 0 | OUTPATIENT
Start: 2018-03-12 | End: 2018-04-10

## 2018-03-12 RX ORDER — DILTIAZEM HCL 120 MG
240 CAPSULE, EXT RELEASE 24 HR ORAL DAILY
Qty: 0 | Refills: 0 | Status: DISCONTINUED | OUTPATIENT
Start: 2018-03-12 | End: 2018-03-13

## 2018-03-12 RX ORDER — DILTIAZEM HCL 120 MG
1 CAPSULE, EXT RELEASE 24 HR ORAL
Qty: 120 | Refills: 0 | OUTPATIENT
Start: 2018-03-12 | End: 2018-04-10

## 2018-03-12 RX ORDER — METOPROLOL TARTRATE 50 MG
1 TABLET ORAL
Qty: 0 | Refills: 0 | COMMUNITY

## 2018-03-12 RX ADMIN — ROSUVASTATIN CALCIUM 5 MILLIGRAM(S): 5 TABLET ORAL at 22:10

## 2018-03-12 RX ADMIN — Medication 50 MILLIGRAM(S): at 08:40

## 2018-03-12 RX ADMIN — SODIUM CHLORIDE 75 MILLILITER(S): 9 INJECTION INTRAMUSCULAR; INTRAVENOUS; SUBCUTANEOUS at 11:27

## 2018-03-12 RX ADMIN — Medication 5 MILLIGRAM(S): at 00:01

## 2018-03-12 RX ADMIN — Medication 81 MILLIGRAM(S): at 11:26

## 2018-03-12 NOTE — PROGRESS NOTE ADULT - ASSESSMENT
71 yo male h/o cad s/p cabg, htn, vertigo a/w lightheadedness, presyncope    cards eval noted  MRI noted  cath noted  medical mngt  bp meds adjusted as per cards given hyperdynamic LV  further mngt as outpt    neuro f/u noted  no further inpt w/u    pt with mildly low finger sticks  endo consult noted  no evid of hypoglycemia  outpt f/u    htn  uncontrolled  metoprolol and Cardizem as ordered  cards f/u    cont other  meds  dvt ppx  dc planning  dc home with outpt f/u   no further inpt w/u warranted

## 2018-03-12 NOTE — PROGRESS NOTE ADULT - SUBJECTIVE AND OBJECTIVE BOX
HPI:  Left heart cath performed yesterday. All territories are perfused/collateralized.  Patient continues to report dizziness.  Endocrine evaluation in progress.  Hold parameters for heart rate liberalized to allow for lower rates and administration of beta-blocker and calcium channel blocker.    Review Of Systems:           Respiratory: No shortness of breath, cough, or wheezing  Cardiovascular: No chest pain or palpitations  10 point review of systems is otherwise negative except as mentioned above        Medications:  aspirin enteric coated 81 milliGRAM(s) Oral daily  atorvastatin 20 milliGRAM(s) Oral at bedtime  diltiazem    Tablet 60 milliGRAM(s) Oral every 6 hours  heparin  Injectable 5000 Unit(s) SubCutaneous every 8 hours  meclizine 12.5 milliGRAM(s) Oral two times a day PRN  metoprolol succinate ER 50 milliGRAM(s) Oral daily  sodium chloride 0.9%. 1000 milliLiter(s) IV Continuous <Continuous>  zaleplon 5 milliGRAM(s) Oral at bedtime PRN    PAST MEDICAL & SURGICAL HISTORY:  Vertigo  Coronary artery disease  S/P CABG x 4    Vitals:  T(C): 36.7 (03-09-18 @ 11:14), Max: 37.2 (03-09-18 @ 00:05)  HR: 60 (03-09-18 @ 13:45) (55 - 64)  BP: 127/74 (03-09-18 @ 13:45) (127/74 - 168/85)  BP(mean): --  RR: 18 (03-09-18 @ 11:14) (18 - 18)  SpO2: 98% (03-09-18 @ 11:14) (97% - 98%)  Wt(kg): --  Daily     Daily   I&O's Summary    08 Mar 2018 07:01  -  09 Mar 2018 07:00  --------------------------------------------------------  IN: 1200 mL / OUT: 1400 mL / NET: -200 mL    09 Mar 2018 07:01  -  09 Mar 2018 17:30  --------------------------------------------------------  IN: 0 mL / OUT: 700 mL / NET: -700 mL        Physical Exam:  Appearance: No acute distress; well appearing  Eyes: PERRL, EOMI, pink conjunctiva  HENT: Normal oral mucosa  Cardiovascular: RRR, S1, S2, no murmurs, rubs, or gallops; no edema; no JVD  Procedural access site: Right groin clean, dry, intact without hematoma  Respiratory: Clear to auscultation bilaterally  Gastrointestinal: soft, non-tender, non-distended with normal bowel sounds  Musculoskeletal: No clubbing; no joint deformity   Neurologic: Non-focal  Lymphatic: No lymphadenopathy  Psychiatry: AAOx3, mood & affect appropriate  Skin: No rashes, ecchymoses, or cyanosis                          14.5   7.50  )-----------( 195      ( 09 Mar 2018 08:07 )             43.6     03-09    136  |  99  |  18  ----------------------------<  91  3.7   |  26  |  1.27    Ca    9.0      09 Mar 2018 06:55      PT/INR - ( 08 Mar 2018 12:41 )   PT: 11.1 sec;   INR: 1.02 ratio         PTT - ( 08 Mar 2018 12:41 )  PTT:30.3 sec      Serum Pro-Brain Natriuretic Peptide: 197 pg/mL (03-05 @ 10:44)      Hemoglobin A1C, Whole Blood: 5.0 % (03-09 @ 08:07)      ECG: NSR    Echo: < from: Transthoracic Echocardiogram (01.18.18 @ 14:44) >  ------------------------------------------------------------------------  Conclusions:  1. Normal mitral valve. Mild mitral regurgitation.  2. Thickened, trileaflet aortic valve. Peak transaortic  valve gradient equals 5 mm Hg, mean transaortic valve  gradient equals 3 mm Hg, estimated aortic valve area equals  1.7 sqcm (by continuity equation), aortic valve velocity  time integral equals 24 cm, consistent with mild aortic  stenosis. Minimal aortic regurgitation.  3. Endocardium not well visualized; grossly preserved left  ventricular systolic function. Septal motion consistent  with cardiac surgery. There is mild hypokinesis if the  basal inferolateral wall.  4. Normal diastolic function  5. The right ventricle is not well visualized; grossly  normal right ventricular systolic function.  *** No previous Echo exam.  ------------------------------------------------------------------------  Confirmed on  1/19/2018 - 13:39:26 by Kenan Glover M.D.  ------------------------------------------------------------------------    < end of copied text >    Cath: < from: Cardiac Cath Lab - Adult (03.08.18 @ 15:41) >  VENTRICLES: No left ventriculogram was performed.  CORONARY VESSELS: The coronary circulation is right dominant.  LM:   --  LM: Normal.  LAD:   --  Proximal LAD: There was a tubular 50 % stenosis. There was good  blood supply to the distal myocardium from a graft.  CX:   --  OM1: There was a 100 % stenosis. There was good blood supply to  the distal myocardium from a graft.  --  OM2: The vessel was small sized. Angiography showed mild  atherosclerosis with no flow limiting lesions.  RI:   --  Ramus intermedius: The vessel was medium sized. Angiography  showed minor luminal irregularities with no flow limiting lesions.  RCA:   --  Proximal RCA: There was a 100 % stenosis. There was good  collateral bloodsupply to the distal myocardium from the LAD and LCx.  GRAFTS:   --  Graft to the distal LAD: The graft was a LIMA. There was a 40  % stenosis at the distal anastomosis.  --  Graft to the 1st obtuse marginal: The graft was a saphenous vein graft  from the aorta. Graft angiography showed minor luminal irregularities.  --  Graft to the distal RCA: The graft was a saphenous vein graft from the  aorta. There was a 100 % stenosis at the proximal anastomosis.  AORTA: The root exhibited normal size.  COMPLICATIONS: There were no complications.  DIAGNOSTIC IMPRESSIONS: Patent LIMA-dLAD, Patent SVG-Om. Occluded SVG-RCA  (RCA well collateralized).  DIAGNOSTIC RECOMMENDATIONS: Medical therapy.  Prepared and signed by  Amy Almaraz M.D.  Signed 03/09/2018 06:50:27    < end of copied text >      Imaging: < from: MR Cardiac w/wo IV Cont (03.06.18 @ 15:28) >  Left ventricle:  EDV: 104 ml  EDVI: 55 ml/meter square  ESV: 23 ml  ESVI: 12 ml/meter square  Stroke Volume: 81 ml  Ejection Fraction: 78 %    IMPRESSION: Mid wall myocardial delayed hyperenhancement involving the   inferior wall of the left ventricle extending from the base towards the   mid left ventricle. Differential diagnosis include infiltrative   cardiomyopathy including sarcoidosis or myocarditis. The constellation of   findings would be atypical for cardiac amyloidosis.    HAZEL PERALTA M.D. ATTENDING RADIOLOGIST  This document has been electronically signed. Mar  6 2018  3:58PM    < end of copied text >      Interpretation of Telemetry: NSR
HPI:  Patient seen to be out of bed and ambulating without assistance for the first time on this admission.  Left heart cath shows no significant obstructive CAD    Review Of Systems:           Respiratory: No shortness of breath, cough, or wheezing  Cardiovascular: No chest pain or palpitations  10 point review of systems is otherwise negative except as mentioned above        Medications:  aspirin enteric coated 81 milliGRAM(s) Oral daily  atorvastatin 20 milliGRAM(s) Oral at bedtime  diltiazem    Tablet 30 milliGRAM(s) Oral every 6 hours  heparin  Injectable 5000 Unit(s) SubCutaneous every 8 hours  meclizine 12.5 milliGRAM(s) Oral two times a day PRN  metoprolol succinate ER 50 milliGRAM(s) Oral daily    PAST MEDICAL & SURGICAL HISTORY:  Vertigo  Coronary artery disease  S/P CABG x 4    Vitals:  T(C): 36.5 (18 @ 12:08), Max: 36.8 (18 @ 20:38)  HR: 60 (18 @ 12:08) (56 - 80)  BP: 182/93 (18 @ 12:08) (147/83 - 182/93)  BP(mean): --  RR: 18 (18 @ 12:08) (18 - 18)  SpO2: 99% (18 @ 12:08) (94% - 99%)  Wt(kg): --  Daily     Daily Weight in k.6 (08 Mar 2018 07:31)  I&O's Summary    08 Mar 2018 07:01  -  08 Mar 2018 18:00  --------------------------------------------------------  IN: 600 mL / OUT: 400 mL / NET: 200 mL        Physical Exam:  Appearance: No acute distress; well appearing  Eyes: PERRL, EOMI, pink conjunctiva  HENT: Normal oral mucosa  Cardiovascular: RRR, S1, S2, no murmurs, rubs, or gallops; no edema; no JVD  Procedural access site (right groin): clean, dry, and intact without hematoma  Respiratory: Clear to auscultation bilaterally  Gastrointestinal: soft, non-tender, non-distended with normal bowel sounds  Musculoskeletal: No clubbing; no joint deformity   Neurologic: Non-focal  Lymphatic: No lymphadenopathy  Psychiatry: AAOx3, mood & affect appropriate  Skin: No rashes, ecchymoses, or cyanosis                          16.7   6.1   )-----------( 235      ( 08 Mar 2018 12:41 )             50.1         139  |  101  |  17  ----------------------------<  110<H>  4.5   |  27  |  1.34<H>    Ca    9.7      08 Mar 2018 12:41      PT/INR - ( 08 Mar 2018 12:41 )   PT: 11.1 sec;   INR: 1.02 ratio         PTT - ( 08 Mar 2018 12:41 )  PTT:30.3 sec      Serum Pro-Brain Natriuretic Peptide: 197 pg/mL ( @ 10:44)      Hemoglobin A1C, Whole Blood: 5.0 % ( @ 15:23)    Echo: < from: Transthoracic Echocardiogram (18 @ 14:44) >  ------------------------------------------------------------------------  Conclusions:  1. Normal mitral valve. Mild mitral regurgitation.  2. Thickened, trileaflet aortic valve. Peak transaortic  valve gradient equals 5 mm Hg, mean transaortic valve  gradient equals 3 mm Hg, estimated aortic valve area equals  1.7 sqcm (by continuity equation), aortic valve velocity  time integral equals 24 cm, consistent with mild aortic  stenosis. Minimal aortic regurgitation.  3. Endocardium not well visualized; grossly preserved left  ventricular systolic function. Septal motion consistent  with cardiac surgery. There is mild hypokinesis if the  basal inferolateral wall.  4. Normal diastolic function  5. The right ventricle is not well visualized; grossly  normal right ventricular systolic function.  *** No previous Echo exam.  ------------------------------------------------------------------------  Confirmed on  2018 - 13:39:26 by Kenan Glover M.D.    < end of copied text >      Cath: two patent grafts; no obstructive disease    Imaging: < from: MR Cardiac w/wo IV Cont (18 @ 15:28) >    IMPRESSION: Mid wall myocardial delayed hyperenhancement involving the   inferior wall of the left ventricle extending from the base towards the   mid left ventricle. Differential diagnosis include infiltrative   cardiomyopathy including sarcoidosis or myocarditis. The constellation of   findings would be atypical for cardiac amyloidosis.    HAZEL PERALTA M.D. ATTENDING RADIOLOGIST  This document has been electronically signed. Mar  6 2018  3:58PM    < end of copied text >    Interpretation of Telemetry: NSR
BENNETT RODRIGUEZ  72y Male  MRN:[id number]    Patient is a 72y old  Male who presents with a chief complaint of pre-syncope (05 Mar 2018 15:36)    HPI:  73yo m pmh CAD s/p CABG, HLD, p/w lightheadedness. Pt states intermittent lightheadedness for past few months, was seen at this hospital two months ago and told had BPPV but pt explains that this lightheadedness is different from the dizziness at that time. Pt denies chest pain and palpitations. Pt feels symptoms are 2/2 hypoglycemia because they seem to improve with meals however pt has not checked his FS during episodes. No fevers, chills, chest pain, SOB, abdominal pain, urinary symptoms. (05 Mar 2018 15:36)      Patient seen and evaluated at bedside. No acute events overnight except as noted.    Interval HPI: no acute events o/n. still c/o "lightheadedness"    PAST MEDICAL & SURGICAL HISTORY:  Vertigo  Coronary artery disease  S/P CABG x 4      REVIEW OF SYSTEMS:  as per hpi    VITALS:  Vital Signs Last 24 Hrs  T(C): 36.7 (10 Mar 2018 21:27), Max: 36.7 (10 Mar 2018 06:10)  T(F): 98.1 (10 Mar 2018 21:27), Max: 98.1 (10 Mar 2018 21:27)  HR: 57 (10 Mar 2018 21:27) (57 - 63)  BP: 150/75 (10 Mar 2018 21:27) (146/83 - 165/78)  BP(mean): --  RR: 18 (10 Mar 2018 21:27) (18 - 18)  SpO2: 97% (10 Mar 2018 21:27) (91% - 97%)    PHYSICAL EXAM:  GENERAL: NAD, well-developed  HEAD:  Atraumatic, Normocephalic  EYES: EOMI, PERRLA, conjunctiva and sclera clear  NECK: Supple, No JVD  CHEST/LUNG: Clear to auscultation bilaterally; No wheeze  HEART: S1, S2; No murmurs, rubs, or gallops  ABDOMEN: Soft, Nontender, Nondistended; Bowel sounds present  EXTREMITIES:  2+ Peripheral Pulses, No clubbing, cyanosis, or edema  PSYCH: Normal affect  NEUROLOGY: AAOX3; non-focal  SKIN: No rashes or lesions    Consultant(s) Notes Reviewed:  [x ] YES  [ ] NO  Care Discussed with Consultants/Other Providers [ x] YES  [ ] NO    MEDS:  MEDICATIONS  (STANDING):  aspirin enteric coated 81 milliGRAM(s) Oral daily  atorvastatin 20 milliGRAM(s) Oral at bedtime  diltiazem    Tablet 60 milliGRAM(s) Oral every 6 hours  heparin  Injectable 5000 Unit(s) SubCutaneous every 8 hours  metoprolol succinate ER 50 milliGRAM(s) Oral daily  sodium chloride 0.9%. 1000 milliLiter(s) (75 mL/Hr) IV Continuous <Continuous>    MEDICATIONS  (PRN):  meclizine 12.5 milliGRAM(s) Oral two times a day PRN Dizziness  zaleplon 5 milliGRAM(s) Oral at bedtime PRN Insomnia      ALLERGIES:  No Known Allergies      LABS:                                               14.5   7.50  )-----------( 195      ( 09 Mar 2018 08:07 )             43.6   03-09    136  |  99  |  18  ----------------------------<  91  3.7   |  26  |  1.27    Ca    9.0      09 Mar 2018 06:55        < from: Cardiac Cath Lab - Adult (03.08.18 @ 15:41) >  VENTRICLES: No left ventriculogram was performed.  CORONARY VESSELS: The coronary circulation is right dominant.  LM:   --  LM: Normal.  LAD:   --  Proximal LAD: There was a tubular 50 % stenosis. There was good  blood supply to the distal myocardium from a graft.  CX:   --  OM1: There was a 100 % stenosis. There was good blood supply to  the distal myocardium from a graft.  --  OM2: The vessel was small sized. Angiography showed mild  atherosclerosis with no flow limiting lesions.  RI:   --  Ramus intermedius: The vessel was medium sized. Angiography  showed minor luminal irregularities with no flow limiting lesions.  RCA:   --  Proximal RCA: There was a 100 % stenosis. There was good  collateral bloodsupply to the distal myocardium from the LAD and LCx.  GRAFTS:   --  Graft to the distal LAD: The graft was a LIMA. There was a 40  % stenosis at the distal anastomosis.  --  Graft to the 1st obtuse marginal: The graft was a saphenous vein graft  from the aorta. Graft angiography showed minor luminal irregularities.  --  Graft to the distal RCA: The graft was a saphenous vein graft from the  aorta. There was a 100 % stenosis at the proximal anastomosis.  AORTA: The root exhibited normal size.  COMPLICATIONS: There were no complications.  DIAGNOSTIC IMPRESSIONS: Patent LIMA-dLAD, Patent SVG-Om. Occluded SVG-RCA  (RCA well collateralized).  DIAGNOSTIC RECOMMENDATIONS: Medical therapy.    < end of copied text >        < from: MR Cardiac w/wo IV Cont (03.06.18 @ 15:28) >  IMPRESSION: Mid wall myocardial delayed hyperenhancement involving the   inferior wall of the left ventricle extending from the base towards the   mid left ventricle. Differential diagnosis include infiltrative   cardiomyopathy including sarcoidosis or myocarditis. The constellation of   findings would be atypical for cardiac amyloidosis.    < end of copied text >
BENNETT RODRIGUEZ  72y Male  MRN:[id number]    Patient is a 72y old  Male who presents with a chief complaint of pre-syncope (05 Mar 2018 15:36)    HPI:  73yo m pmh CAD s/p CABG, HLD, p/w lightheadedness. Pt states intermittent lightheadedness for past few months, was seen at this hospital two months ago and told had BPPV but pt explains that this lightheadedness is different from the dizziness at that time. Pt denies chest pain and palpitations. Pt feels symptoms are 2/2 hypoglycemia because they seem to improve with meals however pt has not checked his FS during episodes. No fevers, chills, chest pain, SOB, abdominal pain, urinary symptoms. (05 Mar 2018 15:36)      Patient seen and evaluated at bedside. No acute events overnight except as noted.    Interval HPI: no acute events o/n. still c/o "not feeling 100%"    PAST MEDICAL & SURGICAL HISTORY:  Vertigo  Coronary artery disease  S/P CABG x 4      REVIEW OF SYSTEMS:  as per hpi    VITALS:  Vital Signs Last 24 Hrs  T(C): 36.7 (12 Mar 2018 21:31), Max: 37.1 (12 Mar 2018 11:16)  T(F): 98.1 (12 Mar 2018 21:31), Max: 98.7 (12 Mar 2018 11:16)  HR: 65 (12 Mar 2018 21:31) (56 - 69)  BP: 134/76 (12 Mar 2018 21:31) (132/72 - 158/80)  BP(mean): --  RR: 18 (12 Mar 2018 21:31) (18 - 18)  SpO2: 97% (12 Mar 2018 21:31) (97% - 97%)    PHYSICAL EXAM:  GENERAL: NAD, well-developed  HEAD:  Atraumatic, Normocephalic  EYES: EOMI, PERRLA, conjunctiva and sclera clear  NECK: Supple, No JVD  CHEST/LUNG: Clear to auscultation bilaterally; No wheeze  HEART: S1, S2; No murmurs, rubs, or gallops  ABDOMEN: Soft, Nontender, Nondistended; Bowel sounds present  EXTREMITIES:  2+ Peripheral Pulses, No clubbing, cyanosis, or edema  PSYCH: Normal affect  NEUROLOGY: AAOX3; non-focal  SKIN: No rashes or lesions    Consultant(s) Notes Reviewed:  [x ] YES  [ ] NO  Care Discussed with Consultants/Other Providers [ x] YES  [ ] NO    MEDS:  MEDICATIONS  (STANDING):  aspirin enteric coated 81 milliGRAM(s) Oral daily  diltiazem    Tablet 60 milliGRAM(s) Oral every 6 hours  diltiazem    milliGRAM(s) Oral daily  heparin  Injectable 5000 Unit(s) SubCutaneous every 8 hours  metoprolol succinate ER 50 milliGRAM(s) Oral daily  rosuvastatin 5 milliGRAM(s) Oral at bedtime  sodium chloride 0.9%. 1000 milliLiter(s) (75 mL/Hr) IV Continuous <Continuous>    MEDICATIONS  (PRN):  meclizine 12.5 milliGRAM(s) Oral two times a day PRN Dizziness  zaleplon 5 milliGRAM(s) Oral at bedtime PRN Insomnia      ALLERGIES:  No Known Allergies      LABS:                        no new labs          < from: Cardiac Cath Lab - Adult (03.08.18 @ 15:41) >  VENTRICLES: No left ventriculogram was performed.  CORONARY VESSELS: The coronary circulation is right dominant.  LM:   --  LM: Normal.  LAD:   --  Proximal LAD: There was a tubular 50 % stenosis. There was good  blood supply to the distal myocardium from a graft.  CX:   --  OM1: There was a 100 % stenosis. There was good blood supply to  the distal myocardium from a graft.  --  OM2: The vessel was small sized. Angiography showed mild  atherosclerosis with no flow limiting lesions.  RI:   --  Ramus intermedius: The vessel was medium sized. Angiography  showed minor luminal irregularities with no flow limiting lesions.  RCA:   --  Proximal RCA: There was a 100 % stenosis. There was good  collateral bloodsupply to the distal myocardium from the LAD and LCx.  GRAFTS:   --  Graft to the distal LAD: The graft was a LIMA. There was a 40  % stenosis at the distal anastomosis.  --  Graft to the 1st obtuse marginal: The graft was a saphenous vein graft  from the aorta. Graft angiography showed minor luminal irregularities.  --  Graft to the distal RCA: The graft was a saphenous vein graft from the  aorta. There was a 100 % stenosis at the proximal anastomosis.  AORTA: The root exhibited normal size.  COMPLICATIONS: There were no complications.  DIAGNOSTIC IMPRESSIONS: Patent LIMA-dLAD, Patent SVG-Om. Occluded SVG-RCA  (RCA well collateralized).  DIAGNOSTIC RECOMMENDATIONS: Medical therapy.    < end of copied text >        < from: MR Cardiac w/wo IV Cont (03.06.18 @ 15:28) >  IMPRESSION: Mid wall myocardial delayed hyperenhancement involving the   inferior wall of the left ventricle extending from the base towards the   mid left ventricle. Differential diagnosis include infiltrative   cardiomyopathy including sarcoidosis or myocarditis. The constellation of   findings would be atypical for cardiac amyloidosis.    < end of copied text >
BENNETT RODRIGUEZ  72y Male  MRN:[id number]    Patient is a 72y old  Male who presents with a chief complaint of pre-syncope (05 Mar 2018 15:36)    HPI:  73yo m pmh CAD s/p CABG, HLD, p/w lightheadedness. Pt states intermittent lightheadedness for past few months, was seen at this hospital two months ago and told had BPPV but pt explains that this lightheadedness is different from the dizziness at that time. Pt denies chest pain and palpitations. Pt feels symptoms are 2/2 hypoglycemia because they seem to improve with meals however pt has not checked his FS during episodes. No fevers, chills, chest pain, SOB, abdominal pain, urinary symptoms. (05 Mar 2018 15:36)      Patient seen and evaluated at bedside. No acute events overnight except as noted.    Interval HPI: no acute events o/n    PAST MEDICAL & SURGICAL HISTORY:  Vertigo  Coronary artery disease  S/P CABG x 4      REVIEW OF SYSTEMS:  as per hpi    VITALS:    Vital Signs Last 24 Hrs  T(C): 36.5 (07 Mar 2018 14:14), Max: 36.6 (06 Mar 2018 21:07)  T(F): 97.7 (07 Mar 2018 14:14), Max: 97.9 (07 Mar 2018 05:59)  HR: 62 (07 Mar 2018 14:14) (62 - 64)  BP: 163/88 (07 Mar 2018 14:14) (163/88 - 167/83)  BP(mean): --  RR: 18 (07 Mar 2018 14:14) (18 - 18)  SpO2: 96% (07 Mar 2018 14:14) (96% - 97%)      PHYSICAL EXAM:  GENERAL: NAD, well-developed  HEAD:  Atraumatic, Normocephalic  EYES: EOMI, PERRLA, conjunctiva and sclera clear  NECK: Supple, No JVD  CHEST/LUNG: Clear to auscultation bilaterally; No wheeze  HEART: S1, S2; No murmurs, rubs, or gallops  ABDOMEN: Soft, Nontender, Nondistended; Bowel sounds present  EXTREMITIES:  2+ Peripheral Pulses, No clubbing, cyanosis, or edema  PSYCH: Normal affect  NEUROLOGY: AAOX3; non-focal  SKIN: No rashes or lesions    Consultant(s) Notes Reviewed:  [x ] YES  [ ] NO  Care Discussed with Consultants/Other Providers [ x] YES  [ ] NO    MEDS:  MEDICATIONS  (STANDING):  aspirin enteric coated 81 milliGRAM(s) Oral daily  atorvastatin 20 milliGRAM(s) Oral at bedtime  diltiazem    Tablet 30 milliGRAM(s) Oral every 6 hours  heparin  Injectable 5000 Unit(s) SubCutaneous every 8 hours    MEDICATIONS  (PRN):  meclizine 12.5 milliGRAM(s) Oral two times a day PRN Dizziness      ALLERGIES:  No Known Allergies      LABS:                                       13.9   6.4   )-----------( 183      ( 06 Mar 2018 06:02 )             41.8   03-06    140  |  102  |  19  ----------------------------<  88  4.3   |  25  |  1.36<H>    Ca    9.3      06 Mar 2018 06:04          < from: MR Cardiac w/wo IV Cont (03.06.18 @ 15:28) >  IMPRESSION: Mid wall myocardial delayed hyperenhancement involving the   inferior wall of the left ventricle extending from the base towards the   mid left ventricle. Differential diagnosis include infiltrative   cardiomyopathy including sarcoidosis or myocarditis. The constellation of   findings would be atypical for cardiac amyloidosis.    < end of copied text >
BENNETT RODRIGUEZ  72y Male  MRN:[id number]    Patient is a 72y old  Male who presents with a chief complaint of pre-syncope (05 Mar 2018 15:36)    HPI:  71yo m pmh CAD s/p CABG, HLD, p/w lightheadedness. Pt states intermittent lightheadedness for past few months, was seen at this hospital two months ago and told had BPPV but pt explains that this lightheadedness is different from the dizziness at that time. Pt denies chest pain and palpitations. Pt feels symptoms are 2/2 hypoglycemia because they seem to improve with meals however pt has not checked his FS during episodes. No fevers, chills, chest pain, SOB, abdominal pain, urinary symptoms. (05 Mar 2018 15:36)      Patient seen and evaluated at bedside. No acute events overnight except as noted.    Interval HPI: no acute events o/n. still c/o "lightheadedness"    PAST MEDICAL & SURGICAL HISTORY:  Vertigo  Coronary artery disease  S/P CABG x 4      REVIEW OF SYSTEMS:  as per hpi    VITALS:  Vital Signs Last 24 Hrs  T(C): 36.7 (09 Mar 2018 11:14), Max: 37.2 (09 Mar 2018 00:05)  T(F): 98.1 (09 Mar 2018 11:14), Max: 99 (09 Mar 2018 00:05)  HR: 59 (09 Mar 2018 11:14) (55 - 64)  BP: 145/74 (09 Mar 2018 11:14) (136/83 - 168/85)  BP(mean): --  RR: 18 (09 Mar 2018 11:14) (18 - 18)  SpO2: 98% (09 Mar 2018 11:14) (97% - 98%)    PHYSICAL EXAM:  GENERAL: NAD, well-developed  HEAD:  Atraumatic, Normocephalic  EYES: EOMI, PERRLA, conjunctiva and sclera clear  NECK: Supple, No JVD  CHEST/LUNG: Clear to auscultation bilaterally; No wheeze  HEART: S1, S2; No murmurs, rubs, or gallops  ABDOMEN: Soft, Nontender, Nondistended; Bowel sounds present  EXTREMITIES:  2+ Peripheral Pulses, No clubbing, cyanosis, or edema  PSYCH: Normal affect  NEUROLOGY: AAOX3; non-focal  SKIN: No rashes or lesions    Consultant(s) Notes Reviewed:  [x ] YES  [ ] NO  Care Discussed with Consultants/Other Providers [ x] YES  [ ] NO    MEDS:  MEDICATIONS  (STANDING):  aspirin enteric coated 81 milliGRAM(s) Oral daily  atorvastatin 20 milliGRAM(s) Oral at bedtime  diltiazem    Tablet 30 milliGRAM(s) Oral every 6 hours  heparin  Injectable 5000 Unit(s) SubCutaneous every 8 hours  metoprolol succinate ER 50 milliGRAM(s) Oral daily    MEDICATIONS  (PRN):  meclizine 12.5 milliGRAM(s) Oral two times a day PRN Dizziness  zaleplon 5 milliGRAM(s) Oral at bedtime PRN Insomnia        ALLERGIES:  No Known Allergies      LABS:                                      14.5   7.50  )-----------( 195      ( 09 Mar 2018 08:07 )             43.6   03-09    136  |  99  |  18  ----------------------------<  91  3.7   |  26  |  1.27    Ca    9.0      09 Mar 2018 06:55    < from: Cardiac Cath Lab - Adult (03.08.18 @ 15:41) >  VENTRICLES: No left ventriculogram was performed.  CORONARY VESSELS: The coronary circulation is right dominant.  LM:   --  LM: Normal.  LAD:   --  Proximal LAD: There was a tubular 50 % stenosis. There was good  blood supply to the distal myocardium from a graft.  CX:   --  OM1: There was a 100 % stenosis. There was good blood supply to  the distal myocardium from a graft.  --  OM2: The vessel was small sized. Angiography showed mild  atherosclerosis with no flow limiting lesions.  RI:   --  Ramus intermedius: The vessel was medium sized. Angiography  showed minor luminal irregularities with no flow limiting lesions.  RCA:   --  Proximal RCA: There was a 100 % stenosis. There was good  collateral bloodsupply to the distal myocardium from the LAD and LCx.  GRAFTS:   --  Graft to the distal LAD: The graft was a LIMA. There was a 40  % stenosis at the distal anastomosis.  --  Graft to the 1st obtuse marginal: The graft was a saphenous vein graft  from the aorta. Graft angiography showed minor luminal irregularities.  --  Graft to the distal RCA: The graft was a saphenous vein graft from the  aorta. There was a 100 % stenosis at the proximal anastomosis.  AORTA: The root exhibited normal size.  COMPLICATIONS: There were no complications.  DIAGNOSTIC IMPRESSIONS: Patent LIMA-dLAD, Patent SVG-Om. Occluded SVG-RCA  (RCA well collateralized).  DIAGNOSTIC RECOMMENDATIONS: Medical therapy.    < end of copied text >        < from: MR Cardiac w/wo IV Cont (03.06.18 @ 15:28) >  IMPRESSION: Mid wall myocardial delayed hyperenhancement involving the   inferior wall of the left ventricle extending from the base towards the   mid left ventricle. Differential diagnosis include infiltrative   cardiomyopathy including sarcoidosis or myocarditis. The constellation of   findings would be atypical for cardiac amyloidosis.    < end of copied text >
BENNETT RODRIGUEZ  72y Male  MRN:[id number]    Patient is a 72y old  Male who presents with a chief complaint of pre-syncope (05 Mar 2018 15:36)    HPI:  71yo m pmh CAD s/p CABG, HLD, p/w lightheadedness. Pt states intermittent lightheadedness for past few months, was seen at this hospital two months ago and told had BPPV but pt explains that this lightheadedness is different from the dizziness at that time. Pt denies chest pain and palpitations. Pt feels symptoms are 2/2 hypoglycemia because they seem to improve with meals however pt has not checked his FS during episodes. No fevers, chills, chest pain, SOB, abdominal pain, urinary symptoms. (05 Mar 2018 15:36)      Patient seen and evaluated at bedside. No acute events overnight except as noted.    Interval HPI: no acute events o/n. still c/o "lightheadedness" but somewhat improved    PAST MEDICAL & SURGICAL HISTORY:  Vertigo  Coronary artery disease  S/P CABG x 4      REVIEW OF SYSTEMS:  as per hpi    VITALS:  Vital Signs Last 24 Hrs  T(C): 36.6 (11 Mar 2018 11:38), Max: 36.9 (11 Mar 2018 00:04)  T(F): 97.8 (11 Mar 2018 11:38), Max: 98.5 (11 Mar 2018 00:04)  HR: 64 (11 Mar 2018 17:25) (57 - 67)  BP: 146/71 (11 Mar 2018 17:25) (139/74 - 154/72)  BP(mean): --  RR: 18 (11 Mar 2018 11:38) (18 - 18)  SpO2: 96% (11 Mar 2018 11:38) (96% - 99%)      PHYSICAL EXAM:  GENERAL: NAD, well-developed  HEAD:  Atraumatic, Normocephalic  EYES: EOMI, PERRLA, conjunctiva and sclera clear  NECK: Supple, No JVD  CHEST/LUNG: Clear to auscultation bilaterally; No wheeze  HEART: S1, S2; No murmurs, rubs, or gallops  ABDOMEN: Soft, Nontender, Nondistended; Bowel sounds present  EXTREMITIES:  2+ Peripheral Pulses, No clubbing, cyanosis, or edema  PSYCH: Normal affect  NEUROLOGY: AAOX3; non-focal  SKIN: No rashes or lesions    Consultant(s) Notes Reviewed:  [x ] YES  [ ] NO  Care Discussed with Consultants/Other Providers [ x] YES  [ ] NO    MEDS:  MEDICATIONS  (STANDING):  aspirin enteric coated 81 milliGRAM(s) Oral daily  atorvastatin 20 milliGRAM(s) Oral at bedtime  diltiazem    Tablet 60 milliGRAM(s) Oral every 6 hours  heparin  Injectable 5000 Unit(s) SubCutaneous every 8 hours  metoprolol succinate ER 50 milliGRAM(s) Oral daily  sodium chloride 0.9%. 1000 milliLiter(s) (75 mL/Hr) IV Continuous <Continuous>    MEDICATIONS  (PRN):  meclizine 12.5 milliGRAM(s) Oral two times a day PRN Dizziness  zaleplon 5 milliGRAM(s) Oral at bedtime PRN Insomnia      ALLERGIES:  No Known Allergies      LABS:                                  no new labs        < from: Cardiac Cath Lab - Adult (03.08.18 @ 15:41) >  VENTRICLES: No left ventriculogram was performed.  CORONARY VESSELS: The coronary circulation is right dominant.  LM:   --  LM: Normal.  LAD:   --  Proximal LAD: There was a tubular 50 % stenosis. There was good  blood supply to the distal myocardium from a graft.  CX:   --  OM1: There was a 100 % stenosis. There was good blood supply to  the distal myocardium from a graft.  --  OM2: The vessel was small sized. Angiography showed mild  atherosclerosis with no flow limiting lesions.  RI:   --  Ramus intermedius: The vessel was medium sized. Angiography  showed minor luminal irregularities with no flow limiting lesions.  RCA:   --  Proximal RCA: There was a 100 % stenosis. There was good  collateral bloodsupply to the distal myocardium from the LAD and LCx.  GRAFTS:   --  Graft to the distal LAD: The graft was a LIMA. There was a 40  % stenosis at the distal anastomosis.  --  Graft to the 1st obtuse marginal: The graft was a saphenous vein graft  from the aorta. Graft angiography showed minor luminal irregularities.  --  Graft to the distal RCA: The graft was a saphenous vein graft from the  aorta. There was a 100 % stenosis at the proximal anastomosis.  AORTA: The root exhibited normal size.  COMPLICATIONS: There were no complications.  DIAGNOSTIC IMPRESSIONS: Patent LIMA-dLAD, Patent SVG-Om. Occluded SVG-RCA  (RCA well collateralized).  DIAGNOSTIC RECOMMENDATIONS: Medical therapy.    < end of copied text >        < from: MR Cardiac w/wo IV Cont (03.06.18 @ 15:28) >  IMPRESSION: Mid wall myocardial delayed hyperenhancement involving the   inferior wall of the left ventricle extending from the base towards the   mid left ventricle. Differential diagnosis include infiltrative   cardiomyopathy including sarcoidosis or myocarditis. The constellation of   findings would be atypical for cardiac amyloidosis.    < end of copied text >
BENNETT RODRIGUEZ  72y Male  MRN:[id number]    Patient is a 72y old  Male who presents with a chief complaint of pre-syncope (05 Mar 2018 15:36)    HPI:  73yo m pmh CAD s/p CABG, HLD, p/w lightheadedness. Pt states intermittent lightheadedness for past few months, was seen at this hospital two months ago and told had BPPV but pt explains that this lightheadedness is different from the dizziness at that time. Pt denies chest pain and palpitations. Pt feels symptoms are 2/2 hypoglycemia because they seem to improve with meals however pt has not checked his FS during episodes. No fevers, chills, chest pain, SOB, abdominal pain, urinary symptoms. (05 Mar 2018 15:36)      Patient seen and evaluated at bedside. No acute events overnight except as noted.    Interval HPI: no acute events o/n    PAST MEDICAL & SURGICAL HISTORY:  Vertigo  Coronary artery disease  S/P CABG x 4      REVIEW OF SYSTEMS:  as per hpi    VITALS:  Vital Signs Last 24 Hrs  T(C): 36.7 (06 Mar 2018 12:23), Max: 36.9 (05 Mar 2018 19:40)  T(F): 98 (06 Mar 2018 12:23), Max: 98.4 (05 Mar 2018 19:40)  HR: 59 (06 Mar 2018 12:23) (59 - 67)  BP: 172/94 (06 Mar 2018 12:23) (163/85 - 180/90)  BP(mean): --  RR: 18 (06 Mar 2018 12:23) (16 - 18)  SpO2: 97% (06 Mar 2018 12:23) (95% - 97%)  CAPILLARY BLOOD GLUCOSE      POCT Blood Glucose.: 122 mg/dL (06 Mar 2018 16:32)  POCT Blood Glucose.: 90 mg/dL (06 Mar 2018 08:08)    I&O's Summary    05 Mar 2018 07:  -  06 Mar 2018 07:00  --------------------------------------------------------  IN: 1290 mL / OUT: 0 mL / NET: 1290 mL    06 Mar 2018 07:01  -  06 Mar 2018 18:11  --------------------------------------------------------  IN: 480 mL / OUT: 0 mL / NET: 480 mL        PHYSICAL EXAM:  GENERAL: NAD, well-developed  HEAD:  Atraumatic, Normocephalic  EYES: EOMI, PERRLA, conjunctiva and sclera clear  NECK: Supple, No JVD  CHEST/LUNG: Clear to auscultation bilaterally; No wheeze  HEART: S1, S2; No murmurs, rubs, or gallops  ABDOMEN: Soft, Nontender, Nondistended; Bowel sounds present  EXTREMITIES:  2+ Peripheral Pulses, No clubbing, cyanosis, or edema  PSYCH: Normal affect  NEUROLOGY: AAOX3; non-focal  SKIN: No rashes or lesions    Consultant(s) Notes Reviewed:  [x ] YES  [ ] NO  Care Discussed with Consultants/Other Providers [ x] YES  [ ] NO    MEDS:  MEDICATIONS  (STANDING):  aspirin enteric coated 81 milliGRAM(s) Oral daily  atorvastatin 20 milliGRAM(s) Oral at bedtime  heparin  Injectable 5000 Unit(s) SubCutaneous every 8 hours  metoprolol     tartrate 100 milliGRAM(s) Oral daily  sodium chloride 0.9%. 1000 milliLiter(s) (75 mL/Hr) IV Continuous <Continuous>    MEDICATIONS  (PRN):  meclizine 12.5 milliGRAM(s) Oral two times a day PRN Dizziness    ALLERGIES:  No Known Allergies      LABS:                        13.9   6.4   )-----------( 183      ( 06 Mar 2018 06:02 )             41.8     03-06    140  |  102  |  19  ----------------------------<  88  4.3   |  25  |  1.36<H>    Ca    9.3      06 Mar 2018 06:04    TPro  8.6<H>  /  Alb  4.9  /  TBili  0.8  /  DBili  x   /  AST  24  /  ALT  16  /  AlkPhos  80  03-05      CARDIAC MARKERS ( 06 Mar 2018 06:04 )  x     / <0.01 ng/mL / 87 U/L / x     / 1.0 ng/mL  CARDIAC MARKERS ( 05 Mar 2018 10:44 )  x     / <0.01 ng/mL / 108 U/L / x     / 1.1 ng/mL      LIVER FUNCTIONS - ( 05 Mar 2018 10:44 )  Alb: 4.9 g/dL / Pro: 8.6 g/dL / ALK PHOS: 80 U/L / ALT: 16 U/L RC / AST: 24 U/L / GGT: x           Urinalysis Basic - ( 05 Mar 2018 11:29 )    Color: Colorless / Appearance: Clear / S.012 / pH: x  Gluc: x / Ketone: Negative  / Bili: Negative / Urobili: Negative   Blood: x / Protein: Negative / Nitrite: Negative   Leuk Esterase: Negative / RBC: x / WBC 0-2 /HPF   Sq Epi: x / Non Sq Epi: OCC /HPF / Bacteria: x      < from: MR Cardiac w/wo IV Cont (18 @ 15:28) >  IMPRESSION: Mid wall myocardial delayed hyperenhancement involving the   inferior wall of the left ventricle extending from the base towards the   mid left ventricle. Differential diagnosis include infiltrative   cardiomyopathy including sarcoidosis or myocarditis. The constellation of   findings would be atypical for cardiac amyloidosis.    < end of copied text >
BENNETT RODRIGUEZ  72y Male  MRN:[id number]    Patient is a 72y old  Male who presents with a chief complaint of pre-syncope (05 Mar 2018 15:36)    HPI:  71yo m pmh CAD s/p CABG, HLD, p/w lightheadedness. Pt states intermittent lightheadedness for past few months, was seen at this hospital two months ago and told had BPPV but pt explains that this lightheadedness is different from the dizziness at that time. Pt denies chest pain and palpitations. Pt feels symptoms are 2/2 hypoglycemia because they seem to improve with meals however pt has not checked his FS during episodes. No fevers, chills, chest pain, SOB, abdominal pain, urinary symptoms. (05 Mar 2018 15:36)      Patient seen and evaluated at bedside. No acute events overnight except as noted.    Interval HPI: no acute events o/n    PAST MEDICAL & SURGICAL HISTORY:  Vertigo  Coronary artery disease  S/P CABG x 4      REVIEW OF SYSTEMS:  as per hpi    VITALS:  Vital Signs Last 24 Hrs  T(C): 36.5 (08 Mar 2018 12:08), Max: 36.8 (07 Mar 2018 20:38)  T(F): 97.7 (08 Mar 2018 12:08), Max: 98.3 (07 Mar 2018 20:38)  HR: 60 (08 Mar 2018 12:08) (56 - 80)  BP: 182/93 (08 Mar 2018 12:08) (147/83 - 182/93)  BP(mean): --  RR: 18 (08 Mar 2018 12:08) (18 - 18)  SpO2: 99% (08 Mar 2018 12:08) (94% - 99%)      PHYSICAL EXAM:  GENERAL: NAD, well-developed  HEAD:  Atraumatic, Normocephalic  EYES: EOMI, PERRLA, conjunctiva and sclera clear  NECK: Supple, No JVD  CHEST/LUNG: Clear to auscultation bilaterally; No wheeze  HEART: S1, S2; No murmurs, rubs, or gallops  ABDOMEN: Soft, Nontender, Nondistended; Bowel sounds present  EXTREMITIES:  2+ Peripheral Pulses, No clubbing, cyanosis, or edema  PSYCH: Normal affect  NEUROLOGY: AAOX3; non-focal  SKIN: No rashes or lesions    Consultant(s) Notes Reviewed:  [x ] YES  [ ] NO  Care Discussed with Consultants/Other Providers [ x] YES  [ ] NO    MEDS:  MEDICATIONS  (STANDING):  aspirin enteric coated 81 milliGRAM(s) Oral daily  atorvastatin 20 milliGRAM(s) Oral at bedtime  diltiazem    Tablet 30 milliGRAM(s) Oral every 6 hours  heparin  Injectable 5000 Unit(s) SubCutaneous every 8 hours  metoprolol succinate ER 50 milliGRAM(s) Oral daily    MEDICATIONS  (PRN):  meclizine 12.5 milliGRAM(s) Oral two times a day PRN Dizziness        ALLERGIES:  No Known Allergies      LABS:                                   16.7   6.1   )-----------( 235      ( 08 Mar 2018 12:41 )             50.1   03-08    139  |  101  |  17  ----------------------------<  110<H>  4.5   |  27  |  1.34<H>    Ca    9.7      08 Mar 2018 12:41            < from: MR Cardiac w/wo IV Cont (03.06.18 @ 15:28) >  IMPRESSION: Mid wall myocardial delayed hyperenhancement involving the   inferior wall of the left ventricle extending from the base towards the   mid left ventricle. Differential diagnosis include infiltrative   cardiomyopathy including sarcoidosis or myocarditis. The constellation of   findings would be atypical for cardiac amyloidosis.    < end of copied text >
CC: Still c/o lightheadedness    HPI:  71yo m pmh CAD s/p CABG, HLD, p/w lightheadedness. Pt states intermittent lightheadedness for past few months, was seen at this hospital two months ago and told had BPPV but pt explains that this lightheadedness is different from the dizziness at that time. Pt denies chest pain and palpitations. Pt feels symptoms are 2/2 hypoglycemia because they seem to improve with meals however pt has not checked his FS during episodes. No fevers, chills, chest pain, SOB, abdominal pain, urinary symptoms. (05 Mar 2018 15:36)    Review Of Systems:     No chest pain, shortness of breath, or palpitations            Medications:  aspirin enteric coated 81 milliGRAM(s) Oral daily  atorvastatin 20 milliGRAM(s) Oral at bedtime  diltiazem    Tablet 60 milliGRAM(s) Oral every 6 hours  heparin  Injectable 5000 Unit(s) SubCutaneous every 8 hours  meclizine 12.5 milliGRAM(s) Oral two times a day PRN  metoprolol succinate ER 50 milliGRAM(s) Oral daily  sodium chloride 0.9%. 1000 milliLiter(s) IV Continuous <Continuous>  zaleplon 5 milliGRAM(s) Oral at bedtime PRN    PAST MEDICAL & SURGICAL HISTORY:  Vertigo  Coronary artery disease  S/P CABG x 4    Vitals:  T(C): 36.7 (03-10-18 @ 06:10), Max: 37.1 (18 @ 20:42)  HR: 63 (03-10-18 @ 08:44) (59 - 63)  BP: 165/78 (03-10-18 @ 08:44) (127/74 - 165/78)  BP(mean): --  RR: 18 (03-10-18 @ 06:10) (18 - 18)  SpO2: 97% (03-10-18 @ 06:10) (97% - 97%)  Wt(kg): --  Daily     Daily Weight in k.1 (10 Mar 2018 07:24)  I&O's Summary    09 Mar 2018 07:01  -  10 Mar 2018 07:00  --------------------------------------------------------  IN: 1240 mL / OUT: 2900 mL / NET: -1660 mL    10 Mar 2018 06:01  -  10 Mar 2018 11:15  --------------------------------------------------------  IN: 100 mL / OUT: 0 mL / NET: 100 mL        Physical Exam:  Appearance: No acute distress; well appearing  Eyes: PERRL, EOMI, pink conjunctiva  HENT: Normal oral mucosa  Cardiovascular: RRR, S1, S2, no murmurs, rubs, or gallops; no edema; no JVD  Respiratory: Clear to auscultation bilaterally  Gastrointestinal: soft, non-tender, non-distended with normal bowel sounds  Musculoskeletal: No clubbing; no joint deformity   Neurologic: Non-focal  Lymphatic: No lymphadenopathy  Psychiatry: AAOx3, mood & affect appropriate  Skin: No rashes, ecchymoses, or cyanosis                          14.5   7.50  )-----------( 195      ( 09 Mar 2018 08:07 )             43.6     03-    136  |  99  |  18  ----------------------------<  91  3.7   |  26  |  1.27    Ca    9.0      09 Mar 2018 06:55      PT/INR - ( 08 Mar 2018 12:41 )   PT: 11.1 sec;   INR: 1.02 ratio         PTT - ( 08 Mar 2018 12:41 )  PTT:30.3 sec      Serum Pro-Brain Natriuretic Peptide: 197 pg/mL ( @ 10:44)          ECG:    Echo:    Stress Testing:     Cath:    Imaging:    Interpretation of Telemetry: NSR
HPI:  Ongoing vague symptoms of dizziness.    Review Of Systems:           Respiratory: No shortness of breath, cough, or wheezing  Cardiovascular: No chest pain or palpitations  10 point review of systems is otherwise negative except as mentioned above        Medications:  aspirin enteric coated 81 milliGRAM(s) Oral daily  diltiazem    Tablet 60 milliGRAM(s) Oral every 6 hours  diltiazem    milliGRAM(s) Oral daily  heparin  Injectable 5000 Unit(s) SubCutaneous every 8 hours  meclizine 12.5 milliGRAM(s) Oral two times a day PRN  metoprolol succinate ER 50 milliGRAM(s) Oral daily  rosuvastatin 5 milliGRAM(s) Oral at bedtime  sodium chloride 0.9%. 1000 milliLiter(s) IV Continuous <Continuous>  zaleplon 5 milliGRAM(s) Oral at bedtime PRN    PAST MEDICAL & SURGICAL HISTORY:  Vertigo  Coronary artery disease  S/P CABG x 4    Vitals:  T(C): 37.1 (03-12-18 @ 11:16), Max: 37.1 (03-12-18 @ 11:16)  HR: 69 (03-12-18 @ 18:40) (56 - 69)  BP: 155/82 (03-12-18 @ 18:40) (132/72 - 158/80)  BP(mean): --  RR: 18 (03-12-18 @ 18:40) (18 - 18)  SpO2: 97% (03-12-18 @ 18:40) (96% - 97%)  Wt(kg): --  Daily     Daily   I&O's Summary    11 Mar 2018 07:01  -  12 Mar 2018 07:00  --------------------------------------------------------  IN: 1120 mL / OUT: 2400 mL / NET: -1280 mL    12 Mar 2018 07:01  -  12 Mar 2018 20:29  --------------------------------------------------------  IN: 900 mL / OUT: 1600 mL / NET: -700 mL        Physical Exam:  Appearance: No acute distress; well appearing  Eyes: PERRL, EOMI, pink conjunctiva  HENT: Normal oral mucosa  Cardiovascular: RRR, S1, S2, no murmurs, rubs, or gallops; no edema; no JVD  Respiratory: Clear to auscultation bilaterally  Gastrointestinal: soft, non-tender, non-distended with normal bowel sounds  Musculoskeletal: No clubbing; no joint deformity   Neurologic: Non-focal  Lymphatic: No lymphadenopathy  Psychiatry: AAOx3, mood & affect appropriate  Skin: No rashes, ecchymoses, or cyanosis        ECG: NSR    Echo: < from: Transthoracic Echocardiogram (01.18.18 @ 14:44) >  ------------------------------------------------------------------------  Conclusions:  1. Normal mitral valve. Mild mitral regurgitation.  2. Thickened, trileaflet aortic valve. Peak transaortic  valve gradient equals 5 mm Hg, mean transaortic valve  gradient equals 3 mm Hg, estimated aortic valve area equals  1.7 sqcm (by continuity equation), aortic valve velocity  time integral equals 24 cm, consistent with mild aortic  stenosis. Minimal aortic regurgitation.  3. Endocardium not well visualized; grossly preserved left  ventricular systolic function. Septal motion consistent  with cardiac surgery. There is mild hypokinesis if the  basal inferolateral wall.  4. Normal diastolic function  5. The right ventricle is not well visualized; grossly  normal right ventricular systolic function.  *** No previous Echo exam.  ------------------------------------------------------------------------  Confirmed on  1/19/2018 - 13:39:26 by Kenan Glover M.D.  ------------------------------------------------------------------------    < end of copied text >    Cath: < from: Cardiac Cath Lab - Adult (03.08.18 @ 15:41) >  VENTRICLES: No left ventriculogram was performed.  CORONARY VESSELS: The coronary circulation is right dominant.  LM:   --  LM: Normal.  LAD:   --  Proximal LAD: There was a tubular 50 % stenosis. There was good  blood supply to the distal myocardium from a graft.  CX:   --  OM1: There was a 100 % stenosis. There was good blood supply to  the distal myocardium from a graft.  --  OM2: The vessel was small sized. Angiography showed mild  atherosclerosis with no flow limiting lesions.  RI:   --  Ramus intermedius: The vessel was medium sized. Angiography  showed minor luminal irregularities with no flow limiting lesions.  RCA:   --  Proximal RCA: There was a 100 % stenosis. There was good  collateral bloodsupply to the distal myocardium from the LAD and LCx.  GRAFTS:   --  Graft to the distal LAD: The graft was a LIMA. There was a 40  % stenosis at the distal anastomosis.  --  Graft to the 1st obtuse marginal: The graft was a saphenous vein graft  from the aorta. Graft angiography showed minor luminal irregularities.  --  Graft to the distal RCA: The graft was a saphenous vein graft from the  aorta. There was a 100 % stenosis at the proximal anastomosis.  AORTA: The root exhibited normal size.  COMPLICATIONS: There were no complications.  DIAGNOSTIC IMPRESSIONS: Patent LIMA-dLAD, Patent SVG-Om. Occluded SVG-RCA  (RCA well collateralized).  DIAGNOSTIC RECOMMENDATIONS: Medical therapy.  Prepared and signed by  Amy Almaraz M.D.  Signed 03/09/2018 06:50:27    < end of copied text >      Imaging: < from: MR Cardiac w/wo IV Cont (03.06.18 @ 15:28) >  Left ventricle:  EDV: 104 ml  EDVI: 55 ml/meter square  ESV: 23 ml  ESVI: 12 ml/meter square  Stroke Volume: 81 ml  Ejection Fraction: 78 %    IMPRESSION: Mid wall myocardial delayed hyperenhancement involving the   inferior wall of the left ventricle extending from the base towards the   mid left ventricle. Differential diagnosis include infiltrative   cardiomyopathy including sarcoidosis or myocarditis. The constellation of   findings would be atypical for cardiac amyloidosis.    HAZEL PERALTA M.D. ATTENDING RADIOLOGIST  This document has been electronically signed. Mar  6 2018  3:58PM    < end of copied text >      Interpretation of Telemetry: NSR
HPI:  Patient continues to report nonvertiginous dizziness.  MRI suggests non-amyloid infiltrative process.    Review Of Systems:           Respiratory: No shortness of breath, cough, or wheezing  Cardiovascular: +dizziness; no chest pain or palpitations  10 point review of systems is otherwise negative except as mentioned above        Medications:  aspirin enteric coated 81 milliGRAM(s) Oral daily  atorvastatin 20 milliGRAM(s) Oral at bedtime  heparin  Injectable 5000 Unit(s) SubCutaneous every 8 hours  meclizine 12.5 milliGRAM(s) Oral two times a day PRN  metoprolol     tartrate 100 milliGRAM(s) Oral daily  sodium chloride 0.9%. 1000 milliLiter(s) IV Continuous <Continuous>    PAST MEDICAL & SURGICAL HISTORY:  Vertigo  Coronary artery disease  S/P CABG x 4    Vitals:  T(C): 36.6 (03-06-18 @ 21:07), Max: 36.9 (03-06-18 @ 05:32)  HR: 59 (03-06-18 @ 12:23) (59 - 66)  BP: 172/94 (03-06-18 @ 12:23) (163/85 - 180/90)  BP(mean): --  RR: 18 (03-06-18 @ 21:07) (17 - 18)  SpO2: 97% (03-06-18 @ 21:07) (96% - 97%)  Wt(kg): --  Daily     Daily   I&O's Summary    05 Mar 2018 07:01  -  06 Mar 2018 07:00  --------------------------------------------------------  IN: 1290 mL / OUT: 0 mL / NET: 1290 mL    06 Mar 2018 07:01  -  06 Mar 2018 21:59  --------------------------------------------------------  IN: 1280 mL / OUT: 700 mL / NET: 580 mL        Physical Exam:  Appearance: No acute distress; well appearing  Eyes: PERRL, EOMI, pink conjunctiva  HENT: Normal oral mucosa  Cardiovascular: RRR, S1, S2, no murmurs, rubs, or gallops; no edema; no JVD  Respiratory: Clear to auscultation bilaterally  Gastrointestinal: soft, non-tender, non-distended with normal bowel sounds  Musculoskeletal: No clubbing; no joint deformity   Neurologic: Non-focal  Lymphatic: No lymphadenopathy  Psychiatry: AAOx3, mood & affect appropriate  Skin: No rashes, ecchymoses, or cyanosis                          13.9   6.4   )-----------( 183      ( 06 Mar 2018 06:02 )             41.8     03-06    140  |  102  |  19  ----------------------------<  88  4.3   |  25  |  1.36<H>    Ca    9.3      06 Mar 2018 06:04    TPro  8.6<H>  /  Alb  4.9  /  TBili  0.8  /  DBili  x   /  AST  24  /  ALT  16  /  AlkPhos  80  03-05      CARDIAC MARKERS ( 06 Mar 2018 06:04 )  x     / <0.01 ng/mL / 87 U/L / x     / 1.0 ng/mL  CARDIAC MARKERS ( 05 Mar 2018 10:44 )  x     / <0.01 ng/mL / 108 U/L / x     / 1.1 ng/mL      Serum Pro-Brain Natriuretic Peptide: 197 pg/mL (03-05 @ 10:44)          ECG:    Echo: < from: Transthoracic Echocardiogram (01.18.18 @ 14:44) >  ------------------------------------------------------------------------  Conclusions:  1. Normal mitral valve. Mild mitral regurgitation.  2. Thickened, trileaflet aortic valve. Peak transaortic  valve gradient equals 5 mm Hg, mean transaortic valve  gradient equals 3 mm Hg, estimated aortic valve area equals  1.7 sqcm (by continuity equation), aortic valve velocity  time integral equals 24 cm, consistent with mild aortic  stenosis. Minimal aortic regurgitation.  3. Endocardium not well visualized; grossly preserved left  ventricular systolic function. Septal motion consistent  with cardiac surgery. There is mild hypokinesis if the  basal inferolateral wall.  4. Normal diastolic function  5. The right ventricle is not well visualized; grossly  normal right ventricular systolic function.  *** No previous Echo exam.  ------------------------------------------------------------------------  Confirmed on  1/19/2018 - 13:39:26 by Kenan Glover M.D.  ------------------------------------------------------------------------    < end of copied text >      Imaging: < from: MR Cardiac w/wo IV Cont (03.06.18 @ 15:28) >  Left ventricle:  EDV: 104 ml  EDVI: 55 ml/meter square  ESV: 23 ml  ESVI: 12 ml/meter square  Stroke Volume: 81 ml  Ejection Fraction: 78 %    IMPRESSION: Mid wall myocardial delayed hyperenhancement involving the   inferior wall of the left ventricle extending from the base towards the   mid left ventricle. Differential diagnosis include infiltrative   cardiomyopathy including sarcoidosis or myocarditis. The constellation of   findings would be atypical for cardiac amyloidosis.    HAZEL PERALTA M.D. ATTENDING RADIOLOGIST  This document has been electronically signed. Mar  6 2018  3:58PM    < end of copied text >      Interpretation of Telemetry: NSR
HPI:  Review of January echo suggestive of LVOT gradient.  MRI suggestive of previous infiltrative process (not amyloid)    Review Of Systems:           Respiratory: No shortness of breath, cough, or wheezing  Cardiovascular: No chest pain or palpitations  Neuro: +dizzy (non vertiginous)  10 point review of systems is otherwise negative except as mentioned above        Medications:  aspirin enteric coated 81 milliGRAM(s) Oral daily  atorvastatin 20 milliGRAM(s) Oral at bedtime  diltiazem    Tablet 30 milliGRAM(s) Oral every 6 hours  heparin  Injectable 5000 Unit(s) SubCutaneous every 8 hours  meclizine 12.5 milliGRAM(s) Oral two times a day PRN    PAST MEDICAL & SURGICAL HISTORY:  Vertigo  Coronary artery disease  S/P CABG x 4    Vitals:  T(C): 36.5 (18 @ 14:14), Max: 36.6 (18 @ 21:07)  HR: 62 (18 @ 14:14) (62 - 64)  BP: 163/88 (18 @ 14:14) (163/88 - 167/83)  BP(mean): --  RR: 18 (18 @ 14:14) (18 - 18)  SpO2: 96% (18 @ 14:14) (96% - 97%)  Wt(kg): --  Daily     Daily Weight in k.6 (07 Mar 2018 07:41)  I&O's Summary    06 Mar 2018 07:01  -  07 Mar 2018 07:00  --------------------------------------------------------  IN: 1280 mL / OUT: 700 mL / NET: 580 mL        Physical Exam:  Appearance: No acute distress; well appearing  Eyes: PERRL, EOMI, pink conjunctiva  HENT: Normal oral mucosa  Cardiovascular: RRR, S1, S2, no murmurs, rubs, or gallops; no edema; no JVD  Respiratory: Clear to auscultation bilaterally  Gastrointestinal: soft, non-tender, non-distended with normal bowel sounds  Musculoskeletal: No clubbing; no joint deformity   Neurologic: Non-focal  Lymphatic: No lymphadenopathy  Psychiatry: AAOx3, mood & affect appropriate  Skin: No rashes, ecchymoses, or cyanosis                          13.9   6.4   )-----------( 183      ( 06 Mar 2018 06:02 )             41.8         140  |  102  |  19  ----------------------------<  88  4.3   |  25  |  1.36<H>    Ca    9.3      06 Mar 2018 06:04        CARDIAC MARKERS ( 06 Mar 2018 06:04 )  x     / <0.01 ng/mL / 87 U/L / x     / 1.0 ng/mL      Serum Pro-Brain Natriuretic Peptide: 197 pg/mL ( @ 10:44)        Interpretation of Telemetry: NSR
SUBJECTIVE: intermittent dizziness, blurry vision    Medications:  MEDICATIONS  (STANDING):  aspirin enteric coated 81 milliGRAM(s) Oral daily  atorvastatin 20 milliGRAM(s) Oral at bedtime  heparin  Injectable 5000 Unit(s) SubCutaneous every 8 hours  metoprolol     tartrate 100 milliGRAM(s) Oral daily  sodium chloride 0.9%. 1000 milliLiter(s) (75 mL/Hr) IV Continuous <Continuous>    MEDICATIONS  (PRN):  meclizine 12.5 milliGRAM(s) Oral two times a day PRN Dizziness      Labs:  CBC Full  -  ( 06 Mar 2018 06:02 )  WBC Count : 6.4 K/uL  Hemoglobin : 13.9 g/dL  Hematocrit : 41.8 %  Platelet Count - Automated : 183 K/uL  Mean Cell Volume : 95.7 fl  Mean Cell Hemoglobin : 31.9 pg  Mean Cell Hemoglobin Concentration : 33.3 gm/dL  Auto Neutrophil # : x  Auto Lymphocyte # : x  Auto Monocyte # : x  Auto Eosinophil # : x  Auto Basophil # : x  Auto Neutrophil % : x  Auto Lymphocyte % : x  Auto Monocyte % : x  Auto Eosinophil % : x  Auto Basophil % : x    03-06    140  |  102  |  19  ----------------------------<  88  4.3   |  25  |  1.36<H>    Ca    9.3      06 Mar 2018 06:04    TPro  8.6<H>  /  Alb  4.9  /  TBili  0.8  /  DBili  x   /  AST  24  /  ALT  16  /  AlkPhos  80  03-05    CAPILLARY BLOOD GLUCOSE      POCT Blood Glucose.: 85 mg/dL (07 Mar 2018 07:28)  POCT Blood Glucose.: 96 mg/dL (06 Mar 2018 21:32)  POCT Blood Glucose.: 122 mg/dL (06 Mar 2018 16:32)    LIVER FUNCTIONS - ( 05 Mar 2018 10:44 )  Alb: 4.9 g/dL / Pro: 8.6 g/dL / ALK PHOS: 80 U/L / ALT: 16 U/L RC / AST: 24 U/L / GGT: x             Urinalysis Basic - ( 05 Mar 2018 11:29 )    Color: Colorless / Appearance: Clear / S.012 / pH: x  Gluc: x / Ketone: Negative  / Bili: Negative / Urobili: Negative   Blood: x / Protein: Negative / Nitrite: Negative   Leuk Esterase: Negative / RBC: x / WBC 0-2 /HPF   Sq Epi: x / Non Sq Epi: OCC /HPF / Bacteria: x        Vitals:  Vital Signs Last 24 Hrs  T(C): 36.6 (07 Mar 2018 05:59), Max: 36.7 (06 Mar 2018 12:23)  T(F): 97.9 (07 Mar 2018 05:59), Max: 98 (06 Mar 2018 12:23)  HR: 64 (07 Mar 2018 05:59) (59 - 64)  BP: 167/83 (07 Mar 2018 05:59) (167/83 - 172/94)  BP(mean): --  RR: 18 (07 Mar 2018 05:59) (18 - 18)  SpO2: 97% (07 Mar 2018 05:59) (97% - 97%)        NEUROLOGICAL EXAM:    Mental status: Awake, alert, and in no apparent distress. Oriented x 3. Language function is normal. Recent memory, digit span and concentration were normal.     Cranial Nerves: Pupils were equal, round, reactive to light. Extraocular movements were intact without nystagmus. Visual field were full. Fundoscopic exam was deferred. Facial sensation was intact to light touch. There was no facial asymmetry. The palate was upgoing symmetrically and tongue was midline. Hearing acuity was intact to finger rub AU. Shoulder shrug was full bilaterally    Motor exam: Bulk and tone were normal. Strength was 5/5 in all four extremities. Fine finger movements were symmetric and normal.    Reflexes: 1 in the bilateral upper extremities. 1 in the bilateral lower extremities. Toes were downgoing bilaterally.     Sensation: Intact to light touch, temperature.     Coordination: Finger-nose-finger and RAHM without dysmetria.     Gait: deferred    Labs:  CBC Full  -  ( 06 Mar 2018 06:02 )  WBC Count : 6.4 K/uL  Hemoglobin : 13.9 g/dL  Hematocrit : 41.8 %  Platelet Count - Automated : 183 K/uL  Mean Cell Volume : 95.7 fl  Mean Cell Hemoglobin : 31.9 pg  Mean Cell Hemoglobin Concentration : 33.3 gm/dL  Auto Neutrophil # : x  Auto Lymphocyte # : x  Auto Monocyte # : x  Auto Eosinophil # : x  Auto Basophil # : x  Auto Neutrophil % : x  Auto Lymphocyte % : x  Auto Monocyte % : x  Auto Eosinophil % : x  Auto Basophil % : x    03-06    140  |  102  |  19  ----------------------------<  88  4.3   |  25  |  1.36<H>  Ca    9.3      06 Mar 2018 06:04  TPro  8.6<H>  /  Alb  4.9  /  TBili  0.8  /  DBili  x   /  AST  24  /  ALT  16  /  AlkPhos  80  03-05    LIVER FUNCTIONS - ( 05 Mar 2018 10:44 )  Alb: 4.9 g/dL / Pro: 8.6 g/dL / ALK PHOS: 80 U/L / ALT: 16 U/L RC / AST: 24 U/L / GGT: x             Urinalysis Basic - ( 05 Mar 2018 11:29 )    Color: Colorless / Appearance: Clear / S.012 / pH: x  Gluc: x / Ketone: Negative  / Bili: Negative / Urobili: Negative   Blood: x / Protein: Negative / Nitrite: Negative   Leuk Esterase: Negative / RBC: x / WBC 0-2 /HPF   Sq Epi: x / Non Sq Epi: OCC /HPF / Bacteria: x    < from: MR Head w/ IV Cont (18 @ 01:10) >  EXAM:  MR ANGIO BRAIN                          EXAM:  MR ANGIO NECK IC                          EXAM:  MR BRAIN IC                          PROCEDURE DATE:  2018        INTERPRETATION:  HISTORY: Dizziness evaluate for posterior fossa CVA    MRI OF THE BRAIN, MRA OF THE [place] OF [place], AND MRA OF THE CERVICAL   ARTERIES:    TECHNIQUE:     The examination consists of:  1) Axial SPGR   2) Axial FSE T2-weighted   3) Axial FLAIR  4) Sagittal T1 FLAIR  5) Axial GRE and SWI  6) Axial diffusion-weighted   7) 3D time-of-flight MRA of the [place]  8) 2D time-of-flight MRA of the cervical arteries   9) 3D MRA of the carotid artery bifurcations  10) Coronal contrast-enhanced MRA of cervical arteries and [place] (10cc Gadavist, 0 cc discarded)  11) Axial T1-weighted post-contrast    There are no comparison MRIs.      FINDINGS:    There is no evidence of recent infarction on the diffusion-weighted   sequence.  Microvascular ischemic changes involving the periventricular and   subcortical white matter. Mild age-appropriate involutional change.  Evaluation of the posterior fossa is unremarkable.  The sella turcica   region is unremarkable.  Supratentorially, the appearance of the brain   parenchyma and ventricular system is unremarkable.     The MR angiographic evaluation of the [place] demonstrates the   internal carotid arteries to be patent.  The anterior, middle, and   posterior cerebral arteries are unremarkable.  The vertebrobasilar   confluence is unremarkable.  The basilar artery is patent.      The MR angiographic evaluation of the cervical arteries demonstrates the   internal carotid arteries and vertebral arteries to be patent.  Left   vertebral is dominant..      IMPRESSION:      1. Microvascular ischemic change and mild age-appropriate involutional   change  2. Unremarkable MR angiographic evaluation of the cervical arteries and   [place].    LARISSA MARTINEZ M.D., ATTENDING RADIOLOGIST  This document has been electronically signed. 2018  8:58AM
CC: Still lightheaded, but maybe a little less    HPI:  73yo m Pmhx CAD s/p CABG, HLD, p/w lightheadedness. Pt states intermittent lightheadedness for past few months, was seen at this hospital two months ago and told had BPPV but pt explains that this lightheadedness is different from the dizziness at that time. Pt denies chest pain and palpitations. Pt feels symptoms are 2/2 hypoglycemia because they seem to improve with meals however pt has not checked his FS during episodes. No fevers, chills, chest pain, SOB, abdominal pain, urinary symptoms. (05 Mar 2018 15:36)    Review Of Systems:     No chest pain, shortness of breath, or palpitations            Medications:  aspirin enteric coated 81 milliGRAM(s) Oral daily  atorvastatin 20 milliGRAM(s) Oral at bedtime  diltiazem    Tablet 60 milliGRAM(s) Oral every 6 hours  heparin  Injectable 5000 Unit(s) SubCutaneous every 8 hours  meclizine 12.5 milliGRAM(s) Oral two times a day PRN  metoprolol succinate ER 50 milliGRAM(s) Oral daily  sodium chloride 0.9%. 1000 milliLiter(s) IV Continuous <Continuous>  zaleplon 5 milliGRAM(s) Oral at bedtime PRN    PAST MEDICAL & SURGICAL HISTORY:  Vertigo  Coronary artery disease  S/P CABG x 4    Vitals:  T(C): 36.9 (03-11-18 @ 06:25), Max: 36.9 (03-11-18 @ 00:04)  HR: 57 (03-11-18 @ 08:03) (57 - 64)  BP: 152/90 (03-11-18 @ 08:03) (139/74 - 162/77)  BP(mean): --  RR: 18 (03-11-18 @ 06:25) (18 - 18)  SpO2: 98% (03-11-18 @ 06:25) (91% - 99%)  Wt(kg): --  Daily     Daily   I&O's Summary    10 Mar 2018 06:01  -  11 Mar 2018 07:00  --------------------------------------------------------  IN: 2100 mL / OUT: 3000 mL / NET: -900 mL    11 Mar 2018 07:01  -  11 Mar 2018 09:13  --------------------------------------------------------  IN: 0 mL / OUT: 600 mL / NET: -600 mL        Physical Exam:  Appearance: No acute distress; well appearing  Eyes: PERRL, EOMI, pink conjunctiva  HENT: Normal oral mucosa  Cardiovascular: RRR, S1, S2, no murmurs, rubs, or gallops; no edema; no JVD  Respiratory: Clear to auscultation bilaterally  Gastrointestinal: soft, non-tender, non-distended with normal bowel sounds  Musculoskeletal: No clubbing; no joint deformity   Neurologic: Non-focal  Lymphatic: No lymphadenopathy  Psychiatry: AAOx3, mood & affect appropriate  Skin: No rashes, ecchymoses, or cyanosis                  Serum Pro-Brain Natriuretic Peptide: 197 pg/mL (03-05 @ 10:44)          ECG:    Echo:    Stress Testing:     Cath:    Imaging:    Interpretation of Telemetry: Sinus rhythm rate 50s-80s
SUBJECTIVE:   No new complaints, overall about the same but does not seem to find symptoms bothersome at present    Medications:  aspirin enteric coated 81 milliGRAM(s) Oral daily  atorvastatin 20 milliGRAM(s) Oral at bedtime  diltiazem    Tablet 30 milliGRAM(s) Oral every 6 hours  heparin  Injectable 5000 Unit(s) SubCutaneous every 8 hours  meclizine 12.5 milliGRAM(s) Oral two times a day PRN  metoprolol succinate ER 50 milliGRAM(s) Oral daily  zaleplon 5 milliGRAM(s) Oral at bedtime PRN    Labs:  CBC Full  -  ( 08 Mar 2018 12:41 )  WBC Count : 6.1 K/uL  Hemoglobin : 16.7 g/dL  Hematocrit : 50.1 %  Platelet Count - Automated : 235 K/uL  Mean Cell Volume : 95.5 fl  Mean Cell Hemoglobin : 31.8 pg  Mean Cell Hemoglobin Concentration : 33.3 gm/dL  Auto Neutrophil # : x  Auto Lymphocyte # : x  Auto Monocyte # : x  Auto Eosinophil # : x  Auto Basophil # : x  Auto Neutrophil % : x  Auto Lymphocyte % : x  Auto Monocyte % : x  Auto Eosinophil % : x  Auto Basophil % : x  -  136  |  99  |  18  ----------------------------<  91  3.7   |  26  |  1.27  Ca    9.0      09 Mar 2018 06:55  CAPILLARY BLOOD GLUCOSE    POCT Blood Glucose.: 105 mg/dL (09 Mar 2018 07:49)  POCT Blood Glucose.: 96 mg/dL (08 Mar 2018 17:56)  POCT Blood Glucose.: 95 mg/dL (08 Mar 2018 12:10)  PT/INR - ( 08 Mar 2018 12:41 )   PT: 11.1 sec;   INR: 1.02 ratio    PTT - ( 08 Mar 2018 12:41 )  PTT:30.3 sec    Vitals:  Vital Signs Last 24 Hrs  T(C): 36.6 (09 Mar 2018 06:15), Max: 37.2 (09 Mar 2018 00:05)  T(F): 97.9 (09 Mar 2018 06:15), Max: 99 (09 Mar 2018 00:05)  HR: 60 (09 Mar 2018 06:15) (55 - 66)  BP: 166/86 (09 Mar 2018 06:15) (136/83 - 182/93)  BP(mean): --  RR: 18 (09 Mar 2018 06:15) (18 - 18)  SpO2: 98% (09 Mar 2018 06:15) (97% - 99%)    NEUROLOGICAL EXAM:    Mental status: Awake, alert, and in no apparent distress. Oriented x 3. Language function is normal. Recent memory, digit span and concentration were normal.     Cranial Nerves: Pupils were equal, round, reactive to light. Extraocular movements were intact without nystagmus. Visual field were full. Fundoscopic exam was deferred. Facial sensation was intact to light touch. There was no facial asymmetry. The palate was upgoing symmetrically and tongue was midline. Hearing acuity was intact to finger rub AU. Shoulder shrug was full bilaterally    Motor exam: Bulk and tone were normal. Strength was 5/5 in all four extremities. Fine finger movements were symmetric and normal.    Reflexes: 1 in the bilateral upper extremities. 1 in the bilateral lower extremities. Toes were downgoing bilaterally.     Sensation: Intact to light touch, temperature.     Coordination: Finger-nose-finger and RAHM without dysmetria.     Gait: deferred    Labs:  CBC Full  -  ( 06 Mar 2018 06:02 )  WBC Count : 6.4 K/uL  Hemoglobin : 13.9 g/dL  Hematocrit : 41.8 %  Platelet Count - Automated : 183 K/uL  Mean Cell Volume : 95.7 fl  Mean Cell Hemoglobin : 31.9 pg  Mean Cell Hemoglobin Concentration : 33.3 gm/dL  Auto Neutrophil # : x  Auto Lymphocyte # : x  Auto Monocyte # : x  Auto Eosinophil # : x  Auto Basophil # : x  Auto Neutrophil % : x  Auto Lymphocyte % : x  Auto Monocyte % : x  Auto Eosinophil % : x  Auto Basophil % : x    03-06    140  |  102  |  19  ----------------------------<  88  4.3   |  25  |  1.36<H>  Ca    9.3      06 Mar 2018 06:04  TPro  8.6<H>  /  Alb  4.9  /  TBili  0.8  /  DBili  x   /  AST  24  /  ALT  16  /  AlkPhos  80  03-05    LIVER FUNCTIONS - ( 05 Mar 2018 10:44 )  Alb: 4.9 g/dL / Pro: 8.6 g/dL / ALK PHOS: 80 U/L / ALT: 16 U/L RC / AST: 24 U/L / GGT: x             Urinalysis Basic - ( 05 Mar 2018 11:29 )    Color: Colorless / Appearance: Clear / S.012 / pH: x  Gluc: x / Ketone: Negative  / Bili: Negative / Urobili: Negative   Blood: x / Protein: Negative / Nitrite: Negative   Leuk Esterase: Negative / RBC: x / WBC 0-2 /HPF   Sq Epi: x / Non Sq Epi: OCC /HPF / Bacteria: x    < from: MR Head w/ IV Cont (18 @ 01:10) >  EXAM:  MR ANGIO BRAIN                          EXAM:  MR ANGIO NECK IC                          EXAM:  MR BRAIN IC                          PROCEDURE DATE:  2018        INTERPRETATION:  HISTORY: Dizziness evaluate for posterior fossa CVA    MRI OF THE BRAIN, MRA OF THE [place] OF [place], AND MRA OF THE CERVICAL   ARTERIES:    TECHNIQUE:     The examination consists of:  1) Axial SPGR   2) Axial FSE T2-weighted   3) Axial FLAIR  4) Sagittal T1 FLAIR  5) Axial GRE and SWI  6) Axial diffusion-weighted   7) 3D time-of-flight MRA of the [place]  8) 2D time-of-flight MRA of the cervical arteries   9) 3D MRA of the carotid artery bifurcations  10) Coronal contrast-enhanced MRA of cervical arteries and [place] (10cc Gadavist, 0 cc discarded)  11) Axial T1-weighted post-contrast    There are no comparison MRIs.      FINDINGS:    There is no evidence of recent infarction on the diffusion-weighted   sequence.  Microvascular ischemic changes involving the periventricular and   subcortical white matter. Mild age-appropriate involutional change.  Evaluation of the posterior fossa is unremarkable.  The sella turcica   region is unremarkable.  Supratentorially, the appearance of the brain   parenchyma and ventricular system is unremarkable.     The MR angiographic evaluation of the [place] demonstrates the   internal carotid arteries to be patent.  The anterior, middle, and   posterior cerebral arteries are unremarkable.  The vertebrobasilar   confluence is unremarkable.  The basilar artery is patent.      The MR angiographic evaluation of the cervical arteries demonstrates the   internal carotid arteries and vertebral arteries to be patent.  Left   vertebral is dominant..      IMPRESSION:      1. Microvascular ischemic change and mild age-appropriate involutional   change  2. Unremarkable MR angiographic evaluation of the cervical arteries and   [place].    LARISSA MARTINEZ M.D., ATTENDING RADIOLOGIST  This document has been electronically signed. 2018  8:58AM

## 2018-03-12 NOTE — PROVIDER CONTACT NOTE (OTHER) - ACTION/TREATMENT ORDERED:
Monitor vital signs per order.
Will draw cardiac enzymes with CBC and BMP in the morning, per standing order.
continue to monitor pt.
Will continue to monitor on telemetry. No further episode reported.
continue to monitor pt.

## 2018-03-12 NOTE — PROGRESS NOTE ADULT - ASSESSMENT
72 year-old with known CAD status post remote CABG, recently admitted with BPPV that responded to outpatient Epley maneuver as outpatient (neurology office), now readmitted with persistent dizziness and symptoms that make him concerned for hypoglycemia.  Symptoms predate starting Valium or meclizine.  Left heart cath performed 3/8/2018 showed two patent grafts and otherwise well perfused native vessels - no evidence of obstructive CAD as etiology of symptom.  Follow-up endocrine evaluation, although hypoglycemia has never been documented.     Will continue beta-blocker at metoprolol succinate 50mg daily.  Change to diltiazem 240mg PO daily for discharge.  Plan to uptitrate beta-blocker and calcium channel blocker as BP and HR permit to address LVOT gradient.  Continue IVF to increase preload in patient with evidence of LVOT gradient on echo and cardiac MRI.    Discharge planning per primary team.  Follow-up as outpatient.

## 2018-03-12 NOTE — PROGRESS NOTE ADULT - PROVIDER SPECIALTY LIST ADULT
Cardiology
Internal Medicine
Neurology
Cardiology
Neurology
Cardiology
Cardiology

## 2018-03-13 VITALS
SYSTOLIC BLOOD PRESSURE: 167 MMHG | HEART RATE: 61 BPM | DIASTOLIC BLOOD PRESSURE: 86 MMHG | OXYGEN SATURATION: 96 % | RESPIRATION RATE: 16 BRPM | TEMPERATURE: 98 F

## 2018-03-13 LAB — GLUCOSE BLDC GLUCOMTR-MCNC: 105 MG/DL — HIGH (ref 70–99)

## 2018-03-13 PROCEDURE — 82553 CREATINE MB FRACTION: CPT

## 2018-03-13 PROCEDURE — 83880 ASSAY OF NATRIURETIC PEPTIDE: CPT

## 2018-03-13 PROCEDURE — 82565 ASSAY OF CREATININE: CPT

## 2018-03-13 PROCEDURE — 82533 TOTAL CORTISOL: CPT

## 2018-03-13 PROCEDURE — 82550 ASSAY OF CK (CPK): CPT

## 2018-03-13 PROCEDURE — C1894: CPT

## 2018-03-13 PROCEDURE — 93567 NJX CAR CTH SPRVLV AORTGRPHY: CPT

## 2018-03-13 PROCEDURE — 84484 ASSAY OF TROPONIN QUANT: CPT

## 2018-03-13 PROCEDURE — 70450 CT HEAD/BRAIN W/O DYE: CPT

## 2018-03-13 PROCEDURE — 82947 ASSAY GLUCOSE BLOOD QUANT: CPT

## 2018-03-13 PROCEDURE — 85014 HEMATOCRIT: CPT

## 2018-03-13 PROCEDURE — 76937 US GUIDE VASCULAR ACCESS: CPT

## 2018-03-13 PROCEDURE — 84132 ASSAY OF SERUM POTASSIUM: CPT

## 2018-03-13 PROCEDURE — 82803 BLOOD GASES ANY COMBINATION: CPT

## 2018-03-13 PROCEDURE — 84295 ASSAY OF SERUM SODIUM: CPT

## 2018-03-13 PROCEDURE — 82435 ASSAY OF BLOOD CHLORIDE: CPT

## 2018-03-13 PROCEDURE — 71046 X-RAY EXAM CHEST 2 VIEWS: CPT

## 2018-03-13 PROCEDURE — 84436 ASSAY OF TOTAL THYROXINE: CPT

## 2018-03-13 PROCEDURE — 85027 COMPLETE CBC AUTOMATED: CPT

## 2018-03-13 PROCEDURE — 93005 ELECTROCARDIOGRAM TRACING: CPT

## 2018-03-13 PROCEDURE — 82962 GLUCOSE BLOOD TEST: CPT

## 2018-03-13 PROCEDURE — 75561 CARDIAC MRI FOR MORPH W/DYE: CPT

## 2018-03-13 PROCEDURE — 85730 THROMBOPLASTIN TIME PARTIAL: CPT

## 2018-03-13 PROCEDURE — 82024 ASSAY OF ACTH: CPT

## 2018-03-13 PROCEDURE — 82330 ASSAY OF CALCIUM: CPT

## 2018-03-13 PROCEDURE — 81001 URINALYSIS AUTO W/SCOPE: CPT

## 2018-03-13 PROCEDURE — 84443 ASSAY THYROID STIM HORMONE: CPT

## 2018-03-13 PROCEDURE — C1887: CPT

## 2018-03-13 PROCEDURE — 83605 ASSAY OF LACTIC ACID: CPT

## 2018-03-13 PROCEDURE — 99153 MOD SED SAME PHYS/QHP EA: CPT

## 2018-03-13 PROCEDURE — 99285 EMERGENCY DEPT VISIT HI MDM: CPT | Mod: 25

## 2018-03-13 PROCEDURE — 93459 L HRT ART/GRFT ANGIO: CPT

## 2018-03-13 PROCEDURE — 84480 ASSAY TRIIODOTHYRONINE (T3): CPT

## 2018-03-13 PROCEDURE — 80048 BASIC METABOLIC PNL TOTAL CA: CPT

## 2018-03-13 PROCEDURE — 80053 COMPREHEN METABOLIC PANEL: CPT

## 2018-03-13 PROCEDURE — 85610 PROTHROMBIN TIME: CPT

## 2018-03-13 PROCEDURE — 99152 MOD SED SAME PHYS/QHP 5/>YRS: CPT

## 2018-03-13 PROCEDURE — C1769: CPT

## 2018-03-13 PROCEDURE — 83036 HEMOGLOBIN GLYCOSYLATED A1C: CPT

## 2018-03-13 RX ADMIN — Medication 5 MILLIGRAM(S): at 00:01

## 2018-03-13 RX ADMIN — Medication 240 MILLIGRAM(S): at 06:06

## 2018-03-13 RX ADMIN — Medication 81 MILLIGRAM(S): at 11:58

## 2018-03-13 RX ADMIN — Medication 50 MILLIGRAM(S): at 11:57

## 2023-06-06 NOTE — CONSULT NOTE ADULT - CONSULT REQUESTED DATE/TIME
Date: 6/6/2023         Dx: Myalgia (M79.10)  DDD (degenerative disc disease), lumbosacral (M51.37)  Lumbar radiculopathy (M54.16)  Facet syndrome, lumbar (M47.816)  Mechanical low back pain (M54.59)  Lumbar foraminal stenosis (M48.061)  Lumbar spondylosis (M47.816)  Bulge of lumbar disc without myelopathy (M51.36)  Primary osteoarthritis of right hip (M16.11)  Strain of gluteus medius, unspecified laterality, initial encounter (S76.019A)  Scapular dyskinesis (G25.89)  Greater trochanteric bursitis of both hips (M70.61,M70.62)  Biomechanical lesion (M99.9)             Authorized # of Visits:  [id number]       Referring MD:  Emy Villanueva. Next MD visit: none scheduled    Medication Changes since last visit?: No    Subjective:  Shara Montoya report that he does not have any (R) LE symptom at this time. He does have minimal ache across his lower back. He has been doing his HEP (ANABELA or REIL) but not as often as recommended. He also has been using a lumbar roll/support while sitting and driving. He drove over 3 hours this weekend and felt fine in the back. Objective:   ROM: (Pre-test symptom: 1/10 lower back; 0/10 (R) lateral hip)  Lumbar        Movement Loss Pain (+/-)   Flexion Minimal loss NE (B) hamstring tightness   Extension Minimal loss NE lower back tightness   R Sideglide Nil loss NE opposite hip tightess   L Sideglide Nil loss  NE opposite hip tightness     Date: 6/6/2023  Tx#: 2/12 Date: Tx#: 3/ Date: Tx#: 4/ Date: Tx#: 5/ Date: Tx#: 6/ Date: Tx#: 7/ Date:    Tx#: 8/   Pronelying x 1 min; Dec, A    Extension in pronelying x 2 mins; NE    REIL x 10 reps; NE     REIL sag x 5 (3+2) reps; NE    REIL belt OP at pelvis x 10 reps; NE tight lower back    Sustained extension with belt OP at pelvis x 2+2 mins (higher angle); P, NW tight/ache lower back    REIL with sheet OP x 10 reps; NE tight lower back    ANABELA over rail x 5 reps; P, NW mid lower back           Assessment/HEP:   Shara Montoya continue to respond to lumbar extension directional preference exercises. He has increased lumbar mobility with centralized symptoms in the lower back. He was progressed with self overpressure with lumbar extension in pronelying (sag, sheet) and in standing (lean back on rail). He was reinforced in performing his HEP consistently to have an improved carryover of his slowly progressing symptoms. Reinforced posture correction in sitting with a lumbar roll. He agreed and understood his treatment plan. All questions and concerns were answered and addressed at this time. Goals:   (12 visits)  1. Patient to consistently perform her HEP and it's progression to maintain her improved condition. 2. Patient to have reduced, centralized, and abolished symptoms in the low back to enable easier ADLs, functional, work, and recreational activities. 3. Patient to have WNL of lumbar mobility in all directions to be able to  sit prolonged, rise up from sitting, walk prolonged on uneven ground, and get out of his vehicle without producing or increasing symptoms in the (R) lateral buttock and lateral calf. 4. Patient to consistently have good posture to promote her symptomfree condition. Plan:   Re-assess next session and continue with directional preference exercise, posture correction, patient education, and HEP progression. Charges:  TherEx x 2       Total Timed Treatment: 25 mins Total Treatment Time: 27 mins 18-Jan-2018 08:00
